# Patient Record
Sex: FEMALE | Race: WHITE | NOT HISPANIC OR LATINO
[De-identification: names, ages, dates, MRNs, and addresses within clinical notes are randomized per-mention and may not be internally consistent; named-entity substitution may affect disease eponyms.]

---

## 2017-02-11 ENCOUNTER — RX RENEWAL (OUTPATIENT)
Age: 77
End: 2017-02-11

## 2017-03-09 ENCOUNTER — LABORATORY RESULT (OUTPATIENT)
Age: 77
End: 2017-03-09

## 2017-03-09 ENCOUNTER — APPOINTMENT (OUTPATIENT)
Dept: RHEUMATOLOGY | Facility: CLINIC | Age: 77
End: 2017-03-09

## 2017-03-09 VITALS
DIASTOLIC BLOOD PRESSURE: 80 MMHG | SYSTOLIC BLOOD PRESSURE: 124 MMHG | HEART RATE: 70 BPM | HEIGHT: 59 IN | WEIGHT: 145 LBS | BODY MASS INDEX: 29.23 KG/M2 | TEMPERATURE: 97.9 F | RESPIRATION RATE: 20 BRPM

## 2017-03-09 LAB
BILIRUB UR QL STRIP: NORMAL
COLLECTION METHOD: NORMAL
GLUCOSE UR-MCNC: NORMAL
HCG UR QL: 0.2 EU/DL
HGB UR QL STRIP.AUTO: NORMAL
KETONES UR-MCNC: NORMAL
LEUKOCYTE ESTERASE UR QL STRIP: NORMAL
NITRITE UR QL STRIP: NORMAL
PH UR STRIP: 7
PROT UR STRIP-MCNC: NORMAL
SP GR UR STRIP: 1.01
WESR: 16

## 2017-03-10 LAB
APPEARANCE: CLEAR
BACTERIA: NEGATIVE
BASOPHILS # BLD AUTO: 0.07 K/UL
BASOPHILS NFR BLD AUTO: 0.9 %
BILIRUBIN URINE: NEGATIVE
BLOOD URINE: NEGATIVE
COLOR: YELLOW
EOSINOPHIL # BLD AUTO: 0.15 K/UL
EOSINOPHIL NFR BLD AUTO: 2 %
FERRITIN SERPL-MCNC: 58.8 NG/ML
GLUCOSE QUALITATIVE U: NORMAL MG/DL
HCT VFR BLD CALC: 41.1 %
HGB BLD-MCNC: 13.2 G/DL
HYALINE CASTS: 0 /LPF
IMM GRANULOCYTES NFR BLD AUTO: 0.1 %
KETONES URINE: NEGATIVE
LEUKOCYTE ESTERASE URINE: ABNORMAL
LYMPHOCYTES # BLD AUTO: 1.17 K/UL
LYMPHOCYTES NFR BLD AUTO: 15.5 %
MAN DIFF?: NORMAL
MCHC RBC-ENTMCNC: 29.2 PG
MCHC RBC-ENTMCNC: 32.1 GM/DL
MCV RBC AUTO: 90.9 FL
MICROSCOPIC-UA: NORMAL
MONOCYTES # BLD AUTO: 0.42 K/UL
MONOCYTES NFR BLD AUTO: 5.6 %
NEUTROPHILS # BLD AUTO: 5.73 K/UL
NEUTROPHILS NFR BLD AUTO: 75.9 %
NITRITE URINE: NEGATIVE
PH URINE: 7
PLATELET # BLD AUTO: 225 K/UL
PROTEIN URINE: NEGATIVE MG/DL
RBC # BLD: 4.52 M/UL
RBC # FLD: 13.7 %
RED BLOOD CELLS URINE: 1 /HPF
SPECIFIC GRAVITY URINE: 1.01
SQUAMOUS EPITHELIAL CELLS: 1 /HPF
UROBILINOGEN URINE: NORMAL MG/DL
WBC # FLD AUTO: 7.55 K/UL
WHITE BLOOD CELLS URINE: 1 /HPF

## 2017-03-12 LAB
ALBUMIN SERPL ELPH-MCNC: 3.8 G/DL
ALP BLD-CCNC: 49 U/L
ALT SERPL-CCNC: 20 U/L
ANA PAT FLD IF-IMP: ABNORMAL
ANA SER IF-ACNC: ABNORMAL
ANION GAP SERPL CALC-SCNC: 17 MMOL/L
AST SERPL-CCNC: 21 U/L
BILIRUB SERPL-MCNC: 0.5 MG/DL
BUN SERPL-MCNC: 15 MG/DL
CALCIUM SERPL-MCNC: 10.4 MG/DL
CHLORIDE SERPL-SCNC: 105 MMOL/L
CO2 SERPL-SCNC: 24 MMOL/L
CREAT SERPL-MCNC: 0.87 MG/DL
CRP SERPL-MCNC: <0.2 MG/DL
DSDNA AB SER-ACNC: 14 IU/ML
ENA SS-A AB SER IA-ACNC: <0.2 AL
ENA SS-B AB SER IA-ACNC: <0.2 AL
GLUCOSE SERPL-MCNC: 118 MG/DL
IRON SATN MFR SERPL: 28 %
IRON SERPL-MCNC: 93 UG/DL
MAGNESIUM SERPL-MCNC: 2 MG/DL
PHOSPHATE SERPL-MCNC: 3.7 MG/DL
POTASSIUM SERPL-SCNC: 4.8 MMOL/L
PROT SERPL-MCNC: 6.4 G/DL
SODIUM SERPL-SCNC: 146 MMOL/L
TIBC SERPL-MCNC: 333 UG/DL
UIBC SERPL-MCNC: 240 UG/DL

## 2017-05-09 ENCOUNTER — APPOINTMENT (OUTPATIENT)
Dept: OBGYN | Facility: CLINIC | Age: 77
End: 2017-05-09

## 2017-05-09 VITALS
SYSTOLIC BLOOD PRESSURE: 133 MMHG | BODY MASS INDEX: 29.23 KG/M2 | HEIGHT: 59 IN | WEIGHT: 145 LBS | DIASTOLIC BLOOD PRESSURE: 80 MMHG

## 2017-05-09 RX ORDER — MEMANTINE HYDROCHLORIDE 10 MG/1
10 TABLET, FILM COATED ORAL
Qty: 60 | Refills: 0 | Status: ACTIVE | COMMUNITY
Start: 2016-11-17

## 2017-05-16 LAB — CYTOLOGY CVX/VAG DOC THIN PREP: NORMAL

## 2017-05-27 ENCOUNTER — MEDICATION RENEWAL (OUTPATIENT)
Age: 77
End: 2017-05-27

## 2017-06-27 ENCOUNTER — APPOINTMENT (OUTPATIENT)
Dept: RHEUMATOLOGY | Facility: CLINIC | Age: 77
End: 2017-06-27

## 2017-08-07 ENCOUNTER — APPOINTMENT (OUTPATIENT)
Dept: RHEUMATOLOGY | Facility: CLINIC | Age: 77
End: 2017-08-07
Payer: MEDICARE

## 2017-08-07 VITALS
HEIGHT: 58 IN | BODY MASS INDEX: 31.91 KG/M2 | WEIGHT: 152 LBS | RESPIRATION RATE: 16 BRPM | SYSTOLIC BLOOD PRESSURE: 130 MMHG | TEMPERATURE: 98.5 F | HEART RATE: 82 BPM | DIASTOLIC BLOOD PRESSURE: 80 MMHG

## 2017-08-07 DIAGNOSIS — M21.961 UNSPECIFIED ACQUIRED DEFORMITY OF RIGHT LOWER LEG: ICD-10-CM

## 2017-08-07 DIAGNOSIS — M21.962 UNSPECIFIED ACQUIRED DEFORMITY OF RIGHT LOWER LEG: ICD-10-CM

## 2017-08-07 PROCEDURE — 85651 RBC SED RATE NONAUTOMATED: CPT

## 2017-08-07 PROCEDURE — 99214 OFFICE O/P EST MOD 30 MIN: CPT | Mod: 25

## 2017-08-07 PROCEDURE — 36415 COLL VENOUS BLD VENIPUNCTURE: CPT

## 2017-08-08 LAB
ALBUMIN SERPL ELPH-MCNC: 4.4 G/DL
ALP BLD-CCNC: 52 U/L
ALT SERPL-CCNC: 26 U/L
ANION GAP SERPL CALC-SCNC: 13 MMOL/L
APPEARANCE: CLEAR
AST SERPL-CCNC: 26 U/L
BACTERIA: ABNORMAL
BASOPHILS # BLD AUTO: 0.04 K/UL
BASOPHILS NFR BLD AUTO: 0.5 %
BILIRUB SERPL-MCNC: 0.4 MG/DL
BILIRUB UR QL STRIP: NORMAL
BILIRUBIN URINE: NEGATIVE
BLOOD URINE: ABNORMAL
BUN SERPL-MCNC: 18 MG/DL
CALCIUM SERPL-MCNC: 10.4 MG/DL
CHLORIDE SERPL-SCNC: 103 MMOL/L
CLARITY UR: CLEAR
CO2 SERPL-SCNC: 28 MMOL/L
COLLECTION METHOD: NORMAL
COLOR: YELLOW
CREAT SERPL-MCNC: 0.88 MG/DL
CRP SERPL-MCNC: 0.2 MG/DL
EOSINOPHIL # BLD AUTO: 0.15 K/UL
EOSINOPHIL NFR BLD AUTO: 1.9 %
GLUCOSE QUALITATIVE U: NORMAL MG/DL
GLUCOSE SERPL-MCNC: 99 MG/DL
GLUCOSE UR-MCNC: NORMAL
HCG UR QL: 0.2 EU/DL
HCT VFR BLD CALC: 42.9 %
HGB BLD-MCNC: 14.3 G/DL
HGB UR QL STRIP.AUTO: NORMAL
HYALINE CASTS: 0 /LPF
IMM GRANULOCYTES NFR BLD AUTO: 0.4 %
KETONES UR-MCNC: NORMAL
KETONES URINE: NEGATIVE
LEUKOCYTE ESTERASE UR QL STRIP: NORMAL
LEUKOCYTE ESTERASE URINE: ABNORMAL
LYMPHOCYTES # BLD AUTO: 1.39 K/UL
LYMPHOCYTES NFR BLD AUTO: 17.5 %
MAN DIFF?: NORMAL
MCHC RBC-ENTMCNC: 29.7 PG
MCHC RBC-ENTMCNC: 33.3 GM/DL
MCV RBC AUTO: 89.2 FL
MICROSCOPIC-UA: NORMAL
MONOCYTES # BLD AUTO: 0.38 K/UL
MONOCYTES NFR BLD AUTO: 4.8 %
NEUTROPHILS # BLD AUTO: 5.95 K/UL
NEUTROPHILS NFR BLD AUTO: 74.9 %
NITRITE UR QL STRIP: NORMAL
NITRITE URINE: NEGATIVE
PH UR STRIP: 6.5
PH URINE: 6.5
PHOSPHATE SERPL-MCNC: 3.7 MG/DL
PLATELET # BLD AUTO: 239 K/UL
POTASSIUM SERPL-SCNC: 5.7 MMOL/L
PROT SERPL-MCNC: 7.1 G/DL
PROT UR STRIP-MCNC: NORMAL
PROTEIN URINE: NEGATIVE MG/DL
RBC # BLD: 4.81 M/UL
RBC # FLD: 13.4 %
RED BLOOD CELLS URINE: 2 /HPF
SODIUM SERPL-SCNC: 144 MMOL/L
SP GR UR STRIP: 1.01
SPECIFIC GRAVITY URINE: 1.01
SQUAMOUS EPITHELIAL CELLS: 9 /HPF
TSH SERPL-ACNC: 1.68 UIU/ML
UROBILINOGEN URINE: NORMAL MG/DL
WBC # FLD AUTO: 7.94 K/UL
WESR: 20
WHITE BLOOD CELLS URINE: 23 /HPF

## 2017-08-09 LAB
DEPRECATED KAPPA LC FREE/LAMBDA SER: 2.37 RATIO
ENA SS-A AB SER IA-ACNC: <0.2 AL
ENA SS-B AB SER IA-ACNC: <0.2 AL
IGA SER QL IEP: 171 MG/DL
IGG SER QL IEP: 930 MG/DL
IGM SER QL IEP: 88 MG/DL
KAPPA LC CSF-MCNC: 0.91 MG/DL
KAPPA LC SERPL-MCNC: 2.16 MG/DL

## 2017-08-10 LAB — M PROTEIN SPEC IFE-MCNC: NORMAL

## 2017-10-17 ENCOUNTER — MEDICATION RENEWAL (OUTPATIENT)
Age: 77
End: 2017-10-17

## 2017-12-11 ENCOUNTER — APPOINTMENT (OUTPATIENT)
Dept: RHEUMATOLOGY | Facility: CLINIC | Age: 77
End: 2017-12-11

## 2017-12-22 ENCOUNTER — RX RENEWAL (OUTPATIENT)
Age: 77
End: 2017-12-22

## 2017-12-31 ENCOUNTER — RX RENEWAL (OUTPATIENT)
Age: 77
End: 2017-12-31

## 2018-02-17 ENCOUNTER — RX RENEWAL (OUTPATIENT)
Age: 78
End: 2018-02-17

## 2018-02-23 ENCOUNTER — LABORATORY RESULT (OUTPATIENT)
Age: 78
End: 2018-02-23

## 2018-02-23 ENCOUNTER — APPOINTMENT (OUTPATIENT)
Dept: RHEUMATOLOGY | Facility: CLINIC | Age: 78
End: 2018-02-23
Payer: MEDICARE

## 2018-02-23 VITALS
BODY MASS INDEX: 27.82 KG/M2 | RESPIRATION RATE: 17 BRPM | HEIGHT: 59 IN | SYSTOLIC BLOOD PRESSURE: 118 MMHG | TEMPERATURE: 98.7 F | DIASTOLIC BLOOD PRESSURE: 68 MMHG | OXYGEN SATURATION: 98 % | WEIGHT: 138 LBS | HEART RATE: 58 BPM

## 2018-02-23 DIAGNOSIS — F03.90 UNSPECIFIED DEMENTIA W/OUT BEHAVIORAL DISTURBANCE: ICD-10-CM

## 2018-02-23 DIAGNOSIS — Z00.00 ENCOUNTER FOR GENERAL ADULT MEDICAL EXAMINATION W/OUT ABNORMAL FINDINGS: ICD-10-CM

## 2018-02-23 DIAGNOSIS — M15.9 POLYOSTEOARTHRITIS, UNSPECIFIED: ICD-10-CM

## 2018-02-23 DIAGNOSIS — R68.2 DRY MOUTH, UNSPECIFIED: ICD-10-CM

## 2018-02-23 DIAGNOSIS — Z86.69 PERSONAL HISTORY OF OTHER DISEASES OF THE NERVOUS SYSTEM AND SENSE ORGANS: ICD-10-CM

## 2018-02-23 DIAGNOSIS — L65.9 NONSCARRING HAIR LOSS, UNSPECIFIED: ICD-10-CM

## 2018-02-23 DIAGNOSIS — M81.0 AGE-RELATED OSTEOPOROSIS W/OUT CURRENT PATHOLOGICAL FRACTURE: ICD-10-CM

## 2018-02-23 DIAGNOSIS — M25.561 PAIN IN RIGHT KNEE: ICD-10-CM

## 2018-02-23 DIAGNOSIS — G89.29 PAIN IN RIGHT KNEE: ICD-10-CM

## 2018-02-23 DIAGNOSIS — G47.62 SLEEP RELATED LEG CRAMPS: ICD-10-CM

## 2018-02-23 DIAGNOSIS — H04.123 DRY EYE SYNDROME OF BILATERAL LACRIMAL GLANDS: ICD-10-CM

## 2018-02-23 DIAGNOSIS — Z63.4 DISAPPEARANCE AND DEATH OF FAMILY MEMBER: ICD-10-CM

## 2018-02-23 DIAGNOSIS — I73.00 RAYNAUD'S SYNDROME W/OUT GANGRENE: ICD-10-CM

## 2018-02-23 DIAGNOSIS — M35.00 SICCA SYNDROME, UNSPECIFIED: ICD-10-CM

## 2018-02-23 DIAGNOSIS — R76.8 OTHER SPECIFIED ABNORMAL IMMUNOLOGICAL FINDINGS IN SERUM: ICD-10-CM

## 2018-02-23 PROCEDURE — 36415 COLL VENOUS BLD VENIPUNCTURE: CPT

## 2018-02-23 PROCEDURE — 99215 OFFICE O/P EST HI 40 MIN: CPT | Mod: 25

## 2018-02-23 RX ORDER — CEPHALEXIN 500 MG/1
500 CAPSULE ORAL
Qty: 14 | Refills: 0 | Status: DISCONTINUED | COMMUNITY
Start: 2018-01-08

## 2018-02-23 RX ORDER — CARBIDOPA AND LEVODOPA 25; 100 MG/1; MG/1
25-100 TABLET ORAL
Qty: 90 | Refills: 0 | Status: ACTIVE | COMMUNITY
Start: 2018-02-15

## 2018-02-23 RX ORDER — CEFADROXIL 500 MG/1
500 CAPSULE ORAL
Qty: 20 | Refills: 0 | Status: DISCONTINUED | COMMUNITY
Start: 2018-01-29

## 2018-02-23 SDOH — SOCIAL STABILITY - SOCIAL INSECURITY: DISSAPEARANCE AND DEATH OF FAMILY MEMBER: Z63.4

## 2018-02-24 PROBLEM — Z86.69 HISTORY OF PARKINSON'S DISEASE: Status: RESOLVED | Noted: 2018-02-24 | Resolved: 2018-02-24

## 2018-02-24 PROBLEM — Z63.4 WIDOWED: Status: ACTIVE | Noted: 2018-02-24

## 2018-02-24 LAB
ALBUMIN SERPL ELPH-MCNC: 4 G/DL
ALP BLD-CCNC: 55 U/L
ALT SERPL-CCNC: 24 U/L
ANA PAT FLD IF-IMP: ABNORMAL
ANA SER IF-ACNC: ABNORMAL
ANION GAP SERPL CALC-SCNC: 15 MMOL/L
AST SERPL-CCNC: 26 U/L
BILIRUB SERPL-MCNC: 0.3 MG/DL
BUN SERPL-MCNC: 17 MG/DL
CALCIUM SERPL-MCNC: 10 MG/DL
CHLORIDE SERPL-SCNC: 94 MMOL/L
CK SERPL-CCNC: 76 U/L
CO2 SERPL-SCNC: 26 MMOL/L
CREAT SERPL-MCNC: 0.67 MG/DL
CRP SERPL-MCNC: <0.2 MG/DL
FOLATE SERPL-MCNC: 17.3 NG/ML
GLUCOSE SERPL-MCNC: 109 MG/DL
PHOSPHATE SERPL-MCNC: 3.7 MG/DL
POTASSIUM SERPL-SCNC: 5.9 MMOL/L
PROT SERPL-MCNC: 7.1 G/DL
SODIUM SERPL-SCNC: 135 MMOL/L
VIT B12 SERPL-MCNC: 1290 PG/ML

## 2018-02-26 ENCOUNTER — APPOINTMENT (OUTPATIENT)
Dept: RHEUMATOLOGY | Facility: CLINIC | Age: 78
End: 2018-02-26

## 2018-02-26 ENCOUNTER — EMERGENCY (EMERGENCY)
Facility: HOSPITAL | Age: 78
LOS: 1 days | Discharge: HOME | End: 2018-02-26
Attending: EMERGENCY MEDICINE
Payer: MEDICARE

## 2018-02-26 VITALS
DIASTOLIC BLOOD PRESSURE: 78 MMHG | HEART RATE: 75 BPM | SYSTOLIC BLOOD PRESSURE: 135 MMHG | OXYGEN SATURATION: 98 % | RESPIRATION RATE: 16 BRPM | TEMPERATURE: 99 F

## 2018-02-26 VITALS — WEIGHT: 139.99 LBS

## 2018-02-26 DIAGNOSIS — Z96.649 PRESENCE OF UNSPECIFIED ARTIFICIAL HIP JOINT: Chronic | ICD-10-CM

## 2018-02-26 LAB
ALBUMIN SERPL ELPH-MCNC: 3.5 G/DL — SIGNIFICANT CHANGE UP (ref 3.3–5.2)
ALDOLASE SERPL-CCNC: 7 U/L
ALP SERPL-CCNC: 48 U/L — SIGNIFICANT CHANGE UP (ref 40–120)
ALT FLD-CCNC: 20 U/L — SIGNIFICANT CHANGE UP
ANION GAP SERPL CALC-SCNC: 11 MMOL/L — SIGNIFICANT CHANGE UP (ref 5–17)
APPEARANCE UR: CLEAR — SIGNIFICANT CHANGE UP
APTT BLD: 32.7 SEC — SIGNIFICANT CHANGE UP (ref 27.5–37.4)
AST SERPL-CCNC: 22 U/L — SIGNIFICANT CHANGE UP
BACTERIA # UR AUTO: ABNORMAL
BASOPHILS # BLD AUTO: 0 K/UL — SIGNIFICANT CHANGE UP (ref 0–0.2)
BASOPHILS NFR BLD AUTO: 0.5 % — SIGNIFICANT CHANGE UP (ref 0–2)
BILIRUB SERPL-MCNC: 0.6 MG/DL — SIGNIFICANT CHANGE UP (ref 0.4–2)
BILIRUB UR-MCNC: NEGATIVE — SIGNIFICANT CHANGE UP
BUN SERPL-MCNC: 15 MG/DL — SIGNIFICANT CHANGE UP (ref 8–20)
CALCIUM SERPL-MCNC: 8.9 MG/DL — SIGNIFICANT CHANGE UP (ref 8.6–10.2)
CHLORIDE SERPL-SCNC: 86 MMOL/L — LOW (ref 98–107)
CK SERPL-CCNC: 68 U/L — SIGNIFICANT CHANGE UP (ref 25–170)
CO2 SERPL-SCNC: 24 MMOL/L — SIGNIFICANT CHANGE UP (ref 22–29)
COLOR SPEC: SIGNIFICANT CHANGE UP
CREAT SERPL-MCNC: 0.48 MG/DL — LOW (ref 0.5–1.3)
DIFF PNL FLD: NEGATIVE — SIGNIFICANT CHANGE UP
EOSINOPHIL # BLD AUTO: 0.2 K/UL — SIGNIFICANT CHANGE UP (ref 0–0.5)
EOSINOPHIL NFR BLD AUTO: 1.6 % — SIGNIFICANT CHANGE UP (ref 0–6)
EPI CELLS # UR: SIGNIFICANT CHANGE UP
GLUCOSE SERPL-MCNC: 119 MG/DL — HIGH (ref 70–115)
GLUCOSE UR QL: NEGATIVE MG/DL — SIGNIFICANT CHANGE UP
HCT VFR BLD CALC: 37 % — SIGNIFICANT CHANGE UP (ref 37–47)
HGB BLD-MCNC: 12.4 G/DL — SIGNIFICANT CHANGE UP (ref 12–16)
INR BLD: 0.9 RATIO — SIGNIFICANT CHANGE UP (ref 0.88–1.16)
KETONES UR-MCNC: NEGATIVE — SIGNIFICANT CHANGE UP
LACTATE BLDV-MCNC: 2 MMOL/L — SIGNIFICANT CHANGE UP (ref 0.5–2)
LEUKOCYTE ESTERASE UR-ACNC: ABNORMAL
LYMPHOCYTES # BLD AUTO: 1.7 K/UL — SIGNIFICANT CHANGE UP (ref 1–4.8)
LYMPHOCYTES # BLD AUTO: 17.3 % — LOW (ref 20–55)
MAGNESIUM SERPL-MCNC: 1.7 MG/DL — SIGNIFICANT CHANGE UP (ref 1.6–2.6)
MCHC RBC-ENTMCNC: 28.8 PG — SIGNIFICANT CHANGE UP (ref 27–31)
MCHC RBC-ENTMCNC: 33.5 G/DL — SIGNIFICANT CHANGE UP (ref 32–36)
MCV RBC AUTO: 85.8 FL — SIGNIFICANT CHANGE UP (ref 81–99)
MONOCYTES # BLD AUTO: 0.7 K/UL — SIGNIFICANT CHANGE UP (ref 0–0.8)
MONOCYTES NFR BLD AUTO: 6.9 % — SIGNIFICANT CHANGE UP (ref 3–10)
NEUTROPHILS # BLD AUTO: 7.1 K/UL — SIGNIFICANT CHANGE UP (ref 1.8–8)
NEUTROPHILS NFR BLD AUTO: 73.5 % — HIGH (ref 37–73)
NITRITE UR-MCNC: NEGATIVE — SIGNIFICANT CHANGE UP
PH UR: 7 — SIGNIFICANT CHANGE UP (ref 5–8)
PHOSPHATE SERPL-MCNC: 2.4 MG/DL — SIGNIFICANT CHANGE UP (ref 2.4–4.7)
PLATELET # BLD AUTO: 239 K/UL — SIGNIFICANT CHANGE UP (ref 150–400)
POTASSIUM SERPL-MCNC: 4.6 MMOL/L — SIGNIFICANT CHANGE UP (ref 3.5–5.3)
POTASSIUM SERPL-SCNC: 4.6 MMOL/L — SIGNIFICANT CHANGE UP (ref 3.5–5.3)
PROT SERPL-MCNC: 6 G/DL — LOW (ref 6.6–8.7)
PROT UR-MCNC: NEGATIVE MG/DL — SIGNIFICANT CHANGE UP
PROTHROM AB SERPL-ACNC: 9.9 SEC — SIGNIFICANT CHANGE UP (ref 9.8–12.7)
RBC # BLD: 4.31 M/UL — LOW (ref 4.4–5.2)
RBC # FLD: 12.4 % — SIGNIFICANT CHANGE UP (ref 11–15.6)
RBC CASTS # UR COMP ASSIST: SIGNIFICANT CHANGE UP /HPF (ref 0–4)
SODIUM SERPL-SCNC: 121 MMOL/L — LOW (ref 135–145)
SP GR SPEC: 1.01 — SIGNIFICANT CHANGE UP (ref 1.01–1.02)
TROPONIN T SERPL-MCNC: <0.01 NG/ML — SIGNIFICANT CHANGE UP (ref 0–0.06)
UROBILINOGEN FLD QL: NEGATIVE MG/DL — SIGNIFICANT CHANGE UP
WBC # BLD: 9.6 K/UL — SIGNIFICANT CHANGE UP (ref 4.8–10.8)
WBC # FLD AUTO: 9.6 K/UL — SIGNIFICANT CHANGE UP (ref 4.8–10.8)
WBC UR QL: SIGNIFICANT CHANGE UP

## 2018-02-26 PROCEDURE — 84484 ASSAY OF TROPONIN QUANT: CPT

## 2018-02-26 PROCEDURE — 87086 URINE CULTURE/COLONY COUNT: CPT

## 2018-02-26 PROCEDURE — 99284 EMERGENCY DEPT VISIT MOD MDM: CPT | Mod: 25

## 2018-02-26 PROCEDURE — 93005 ELECTROCARDIOGRAM TRACING: CPT

## 2018-02-26 PROCEDURE — 93010 ELECTROCARDIOGRAM REPORT: CPT

## 2018-02-26 PROCEDURE — 83605 ASSAY OF LACTIC ACID: CPT

## 2018-02-26 PROCEDURE — 99291 CRITICAL CARE FIRST HOUR: CPT

## 2018-02-26 PROCEDURE — 82550 ASSAY OF CK (CPK): CPT

## 2018-02-26 PROCEDURE — 71046 X-RAY EXAM CHEST 2 VIEWS: CPT

## 2018-02-26 PROCEDURE — 85027 COMPLETE CBC AUTOMATED: CPT

## 2018-02-26 PROCEDURE — 85730 THROMBOPLASTIN TIME PARTIAL: CPT

## 2018-02-26 PROCEDURE — 70450 CT HEAD/BRAIN W/O DYE: CPT | Mod: 26

## 2018-02-26 PROCEDURE — 83735 ASSAY OF MAGNESIUM: CPT

## 2018-02-26 PROCEDURE — 81001 URINALYSIS AUTO W/SCOPE: CPT

## 2018-02-26 PROCEDURE — 70450 CT HEAD/BRAIN W/O DYE: CPT

## 2018-02-26 PROCEDURE — 71046 X-RAY EXAM CHEST 2 VIEWS: CPT | Mod: 26

## 2018-02-26 PROCEDURE — 36415 COLL VENOUS BLD VENIPUNCTURE: CPT

## 2018-02-26 PROCEDURE — 80053 COMPREHEN METABOLIC PANEL: CPT

## 2018-02-26 PROCEDURE — 85610 PROTHROMBIN TIME: CPT

## 2018-02-26 PROCEDURE — 84100 ASSAY OF PHOSPHORUS: CPT

## 2018-02-26 PROCEDURE — 82962 GLUCOSE BLOOD TEST: CPT

## 2018-02-26 RX ORDER — ASPIRIN/CALCIUM CARB/MAGNESIUM 324 MG
1 TABLET ORAL
Qty: 0 | Refills: 0 | COMMUNITY

## 2018-02-26 RX ORDER — SODIUM CHLORIDE 9 MG/ML
1000 INJECTION INTRAMUSCULAR; INTRAVENOUS; SUBCUTANEOUS ONCE
Qty: 0 | Refills: 0 | Status: DISCONTINUED | OUTPATIENT
Start: 2018-02-26 | End: 2018-03-02

## 2018-02-26 RX ORDER — CALCIUM CARBONATE 500(1250)
2 TABLET ORAL
Qty: 0 | Refills: 0 | COMMUNITY

## 2018-02-26 RX ORDER — SODIUM CHLORIDE 9 MG/ML
3 INJECTION INTRAMUSCULAR; INTRAVENOUS; SUBCUTANEOUS ONCE
Qty: 0 | Refills: 0 | Status: DISCONTINUED | OUTPATIENT
Start: 2018-02-26 | End: 2018-03-02

## 2018-02-26 RX ORDER — CHOLECALCIFEROL (VITAMIN D3) 125 MCG
0 CAPSULE ORAL
Qty: 0 | Refills: 0 | COMMUNITY

## 2018-02-26 RX ORDER — ALPRAZOLAM 0.25 MG
1 TABLET ORAL
Qty: 0 | Refills: 0 | COMMUNITY

## 2018-02-26 RX ORDER — ASCORBIC ACID 60 MG
1 TABLET,CHEWABLE ORAL
Qty: 0 | Refills: 0 | COMMUNITY

## 2018-02-26 RX ORDER — MEMANTINE HYDROCHLORIDE 10 MG/1
1 TABLET ORAL
Qty: 0 | Refills: 0 | COMMUNITY

## 2018-02-26 NOTE — ED ADULT NURSE NOTE - CHIEF COMPLAINT QUOTE
pt brought in by niece for worsening confusion,  as per niece it has been progressively getting more confused over last week  was dx w/ parkinson , started on meds but they were dc/d when symptoms seemed to get worse, niece states today it all seemed worse, pt could not speak clearly today and had trouble walking, no facial droop noted

## 2018-02-26 NOTE — ED PROVIDER NOTE - CARE PLAN
Principal Discharge DX:	Dysphonia  Secondary Diagnosis:	Generalized weakness  Secondary Diagnosis:	Hyponatremia

## 2018-02-26 NOTE — ED PROVIDER NOTE - UNABLE TO OBTAIN
Unable to obtain HPI secondary to AMS. Urgent need for Intervention Unable to obtain ROS secondary to AMS.

## 2018-02-26 NOTE — ED PROVIDER NOTE - CRITICAL CARE PROVIDED
direct patient care (not related to procedure)/consult w/ pt's family directly relating to pts condition/additional history taking/consultation with other physicians/interpretation of diagnostic studies/documentation

## 2018-02-26 NOTE — ED PROVIDER NOTE - OBJECTIVE STATEMENT
This is a 76 y/o female with PMHx dementia and DM presents to the ED accompanied by family for evaluation AMS. Pt niece states she noted around 1530 that pt was not been acting herself, states pt had difficulty ambulating, and difficulty "getting words out." Daughters report pt to be tremulous and pallor. Per daughter, last seen normal between 0800 and 1000 this morning. Pt daughter states pt was seen by PMD yesterday and found to have a potassium of 5.9, however, everything else was within normal limits. Pt takes Aspirin 81, Namenda, Prolia.   PMD: Dr. Torrez   Allergy: Sulfa

## 2018-02-26 NOTE — ED ADULT NURSE NOTE - CHPI ED SYMPTOMS NEG
no dizziness/no change in level of consciousness/no fever/no vomiting/no numbness/no loss of consciousness/no nausea/no blurred vision

## 2018-02-26 NOTE — ED ADULT NURSE REASSESSMENT NOTE - NS ED NURSE REASSESS COMMENT FT1
pt sitting calm in bed. a and o to self. breathing even and unlabored. nsr on cm. family at bedside. pt has no complaints at this time. will continue to monitor.

## 2018-02-26 NOTE — ED PROVIDER NOTE - PHYSICAL EXAMINATION
Const: Awake, alert and oriented. In no acute distress. Well appearing.  HEENT: NC/AT. Moist mucous membranes.  Eyes: No scleral icterus. EOMI.  Neck:. Soft and supple. Full ROM without pain.  Cardiac: Regular rate and rhythm. +S1/S2. No murmurs. Peripheral pulses 2+ and symmetric. No LE edema.  Resp: Speaking in full sentences. No evidence of respiratory distress. No wheezes, rales or rhonchi.  Abd: Soft, non-tender, non-distended. Normal bowel sounds in all 4 quadrants. No guarding or rebound.  Back: Spine midline and non-tender. No CVAT.  Skin: No rashes, abrasions or lacerations.  Lymph: No cervical lymphadenopathy.   CN II-XII grossly in tact. Difficulty with coordination for finger/nose. Normal heel to shin. No pronator drift. Speech clear, upper extremities strength 5/5 symmetrically, RLE strength 1/5, LLE strength 5/5. Reflexes 2+ bilaterally. Babinski negative bilaterally.

## 2018-02-26 NOTE — ED PROVIDER NOTE - MEDICAL DECISION MAKING DETAILS
Pt presents with AMS, concern for stroke, dysarthria, RLE weakness, AMS per the family, no focal neuro deficits on exam, pt discussed with family and intensivist decided this is not a stroke, will work up for other causes of mental status.

## 2018-02-26 NOTE — ED ADULT NURSE NOTE - OBJECTIVE STATEMENT
as per family, pt received at 1530 confused more so from baseline and having difficulty ambulating. as per family, last seen normal in person at 0800 hrs, as per family spoke with pt at 1000 hrs and speaking like normal self at that time. alert and o x1, as per family hx of dementia, baseline a and o x3 with periods of dsyphasia and forgetfulness. breathing even and unlabored. nsr on cm. + and = peripheral pulses. no le edema. abdomen soft nontender nondistended. will continue to monitor.

## 2018-02-26 NOTE — ED ADULT TRIAGE NOTE - CHIEF COMPLAINT QUOTE
pt brought in by niece for worsening confusion,  as per niece it has been progressively getting more confused over last week  was dx w/ parkinson , started on meds but they were dc/d when symptoms seemed to get worse, niece states today it all seemed worse, pt could not speak clearly today and had trouble walking, no facial droop noted pt brought in by niece for worsening confusion,  as per niece it has been progressively getting more confused over last week  was dx w/ parkinson , started on meds but they were dc/d when symptoms seemed to get worse, niece states today it all seemed worse, pt could not speak clearly today and had trouble walking, no facial droop noted, md Ferreira in to emily pt,  at [id number] pt brought in by niece for worsening confusion,  as per niece it has been progressively getting more confused over last week  was dx w/ parkinson , started on meds but they were dc/d when symptoms seemed to get worse, niece states today it all seemed worse, pt could not speak clearly today and had trouble walking, no facial droop noted, md Ferreira in to emily pt,  at [id number]

## 2018-02-27 LAB
CULTURE RESULTS: NO GROWTH — SIGNIFICANT CHANGE UP
SPECIMEN SOURCE: SIGNIFICANT CHANGE UP

## 2018-03-03 LAB
ENA JO1 AB SER IA-ACNC: <0.2 AL
ENA RNP AB SER IA-ACNC: <0.2 AL
ENA SCL70 IGG SER IA-ACNC: 0.5 AL
ENA SM AB SER IA-ACNC: <0.2 AL
ENA SS-A AB SER IA-ACNC: <0.2 AL
ENA SS-B AB SER IA-ACNC: <0.2 AL

## 2018-03-08 ENCOUNTER — APPOINTMENT (OUTPATIENT)
Dept: RHEUMATOLOGY | Facility: CLINIC | Age: 78
End: 2018-03-08
Payer: MEDICARE

## 2018-03-08 PROCEDURE — 96372 THER/PROPH/DIAG INJ SC/IM: CPT

## 2018-05-05 ENCOUNTER — RX RENEWAL (OUTPATIENT)
Age: 78
End: 2018-05-05

## 2018-06-12 ENCOUNTER — APPOINTMENT (OUTPATIENT)
Dept: OBGYN | Facility: CLINIC | Age: 78
End: 2018-06-12
Payer: MEDICARE

## 2018-06-12 VITALS
BODY MASS INDEX: 28.22 KG/M2 | DIASTOLIC BLOOD PRESSURE: 90 MMHG | WEIGHT: 140 LBS | SYSTOLIC BLOOD PRESSURE: 130 MMHG | HEIGHT: 59 IN

## 2018-06-12 DIAGNOSIS — N39.0 URINARY TRACT INFECTION, SITE NOT SPECIFIED: ICD-10-CM

## 2018-06-12 PROCEDURE — 99214 OFFICE O/P EST MOD 30 MIN: CPT

## 2018-06-12 RX ORDER — NITROFURANTOIN (MONOHYDRATE/MACROCRYSTALS) 25; 75 MG/1; MG/1
100 CAPSULE ORAL
Qty: 10 | Refills: 0 | Status: ACTIVE | COMMUNITY
Start: 2018-06-12 | End: 1900-01-01

## 2018-06-12 RX ORDER — CLOTRIMAZOLE AND BETAMETHASONE DIPROPIONATE 10; .5 MG/G; MG/G
1-0.05 CREAM TOPICAL TWICE DAILY
Qty: 1 | Refills: 1 | Status: ACTIVE | COMMUNITY
Start: 2018-06-12 | End: 1900-01-01

## 2018-07-02 ENCOUNTER — INPATIENT (INPATIENT)
Facility: HOSPITAL | Age: 78
LOS: 4 days | Discharge: HOME | DRG: 689 | End: 2018-07-07
Attending: FAMILY MEDICINE | Admitting: HOSPITALIST
Payer: MEDICARE

## 2018-07-02 VITALS
OXYGEN SATURATION: 98 % | SYSTOLIC BLOOD PRESSURE: 166 MMHG | HEART RATE: 90 BPM | TEMPERATURE: 99 F | HEIGHT: 65 IN | DIASTOLIC BLOOD PRESSURE: 90 MMHG | RESPIRATION RATE: 18 BRPM | WEIGHT: 160.06 LBS

## 2018-07-02 DIAGNOSIS — Z96.649 PRESENCE OF UNSPECIFIED ARTIFICIAL HIP JOINT: Chronic | ICD-10-CM

## 2018-07-02 LAB
ALBUMIN SERPL ELPH-MCNC: 4 G/DL — SIGNIFICANT CHANGE UP (ref 3.3–5.2)
ALP SERPL-CCNC: 47 U/L — SIGNIFICANT CHANGE UP (ref 40–120)
ALT FLD-CCNC: 20 U/L — SIGNIFICANT CHANGE UP
AMMONIA BLD-MCNC: 19 UMOL/L — SIGNIFICANT CHANGE UP (ref 11–55)
ANION GAP SERPL CALC-SCNC: 15 MMOL/L — SIGNIFICANT CHANGE UP (ref 5–17)
APPEARANCE UR: CLEAR — SIGNIFICANT CHANGE UP
APTT BLD: 31.9 SEC — SIGNIFICANT CHANGE UP (ref 27.5–37.4)
AST SERPL-CCNC: 20 U/L — SIGNIFICANT CHANGE UP
BACTERIA # UR AUTO: ABNORMAL
BILIRUB SERPL-MCNC: 0.4 MG/DL — SIGNIFICANT CHANGE UP (ref 0.4–2)
BILIRUB UR-MCNC: NEGATIVE — SIGNIFICANT CHANGE UP
BUN SERPL-MCNC: 14 MG/DL — SIGNIFICANT CHANGE UP (ref 8–20)
CALCIUM SERPL-MCNC: 9.4 MG/DL — SIGNIFICANT CHANGE UP (ref 8.6–10.2)
CHLORIDE SERPL-SCNC: 96 MMOL/L — LOW (ref 98–107)
CO2 SERPL-SCNC: 24 MMOL/L — SIGNIFICANT CHANGE UP (ref 22–29)
COLOR SPEC: YELLOW — SIGNIFICANT CHANGE UP
CREAT SERPL-MCNC: 0.61 MG/DL — SIGNIFICANT CHANGE UP (ref 0.5–1.3)
DIFF PNL FLD: ABNORMAL
EPI CELLS # UR: SIGNIFICANT CHANGE UP
GLUCOSE SERPL-MCNC: 116 MG/DL — HIGH (ref 70–115)
GLUCOSE UR QL: NEGATIVE MG/DL — SIGNIFICANT CHANGE UP
HCT VFR BLD CALC: 40.4 % — SIGNIFICANT CHANGE UP (ref 37–47)
HGB BLD-MCNC: 13.9 G/DL — SIGNIFICANT CHANGE UP (ref 12–16)
INR BLD: 0.96 RATIO — SIGNIFICANT CHANGE UP (ref 0.88–1.16)
KETONES UR-MCNC: NEGATIVE — SIGNIFICANT CHANGE UP
LEUKOCYTE ESTERASE UR-ACNC: ABNORMAL
MCHC RBC-ENTMCNC: 29.2 PG — SIGNIFICANT CHANGE UP (ref 27–31)
MCHC RBC-ENTMCNC: 34.4 G/DL — SIGNIFICANT CHANGE UP (ref 32–36)
MCV RBC AUTO: 84.9 FL — SIGNIFICANT CHANGE UP (ref 81–99)
NITRITE UR-MCNC: NEGATIVE — SIGNIFICANT CHANGE UP
PH UR: 7 — SIGNIFICANT CHANGE UP (ref 5–8)
PLATELET # BLD AUTO: 268 K/UL — SIGNIFICANT CHANGE UP (ref 150–400)
POTASSIUM SERPL-MCNC: 4.8 MMOL/L — SIGNIFICANT CHANGE UP (ref 3.5–5.3)
POTASSIUM SERPL-SCNC: 4.8 MMOL/L — SIGNIFICANT CHANGE UP (ref 3.5–5.3)
PROT SERPL-MCNC: 6.8 G/DL — SIGNIFICANT CHANGE UP (ref 6.6–8.7)
PROT UR-MCNC: 15 MG/DL
PROTHROM AB SERPL-ACNC: 10.5 SEC — SIGNIFICANT CHANGE UP (ref 9.8–12.7)
RBC # BLD: 4.76 M/UL — SIGNIFICANT CHANGE UP (ref 4.4–5.2)
RBC # FLD: 12.6 % — SIGNIFICANT CHANGE UP (ref 11–15.6)
RBC CASTS # UR COMP ASSIST: SIGNIFICANT CHANGE UP /HPF (ref 0–4)
SODIUM SERPL-SCNC: 135 MMOL/L — SIGNIFICANT CHANGE UP (ref 135–145)
SP GR SPEC: 1 — LOW (ref 1.01–1.02)
UROBILINOGEN FLD QL: NEGATIVE MG/DL — SIGNIFICANT CHANGE UP
WBC # BLD: 8.1 K/UL — SIGNIFICANT CHANGE UP (ref 4.8–10.8)
WBC # FLD AUTO: 8.1 K/UL — SIGNIFICANT CHANGE UP (ref 4.8–10.8)
WBC UR QL: ABNORMAL

## 2018-07-02 PROCEDURE — 93010 ELECTROCARDIOGRAM REPORT: CPT

## 2018-07-02 PROCEDURE — 99285 EMERGENCY DEPT VISIT HI MDM: CPT

## 2018-07-02 PROCEDURE — 71045 X-RAY EXAM CHEST 1 VIEW: CPT | Mod: 26

## 2018-07-02 RX ORDER — ACETAMINOPHEN 500 MG
650 TABLET ORAL ONCE
Qty: 0 | Refills: 0 | Status: COMPLETED | OUTPATIENT
Start: 2018-07-02 | End: 2018-07-02

## 2018-07-02 RX ORDER — CEFTRIAXONE 500 MG/1
1 INJECTION, POWDER, FOR SOLUTION INTRAMUSCULAR; INTRAVENOUS ONCE
Qty: 0 | Refills: 0 | Status: COMPLETED | OUTPATIENT
Start: 2018-07-02 | End: 2018-07-02

## 2018-07-02 RX ORDER — ALPRAZOLAM 0.25 MG
0.25 TABLET ORAL ONCE
Qty: 0 | Refills: 0 | Status: DISCONTINUED | OUTPATIENT
Start: 2018-07-02 | End: 2018-07-02

## 2018-07-02 RX ORDER — SODIUM CHLORIDE 9 MG/ML
3 INJECTION INTRAMUSCULAR; INTRAVENOUS; SUBCUTANEOUS ONCE
Qty: 0 | Refills: 0 | Status: COMPLETED | OUTPATIENT
Start: 2018-07-02 | End: 2018-07-02

## 2018-07-02 RX ADMIN — Medication 650 MILLIGRAM(S): at 23:53

## 2018-07-02 RX ADMIN — Medication 0.25 MILLIGRAM(S): at 23:53

## 2018-07-02 RX ADMIN — CEFTRIAXONE 100 GRAM(S): 500 INJECTION, POWDER, FOR SOLUTION INTRAMUSCULAR; INTRAVENOUS at 23:53

## 2018-07-02 RX ADMIN — SODIUM CHLORIDE 3 MILLILITER(S): 9 INJECTION INTRAMUSCULAR; INTRAVENOUS; SUBCUTANEOUS at 20:04

## 2018-07-02 NOTE — ED PROVIDER NOTE - OBJECTIVE STATEMENT
76 y/o F presents to ED c/o confusion and altered mental status today. Patient cannot recall why she is in the ED and is unable to answer questions.    As per family at bedside, the patient "has not been right for about 2 weeks". They note the patient has hx of dementia, and at baseline is forgetful and has intermittent mild aphasia, but never as severe as how altered she is now. The patient has had 3 falls over the past 2 weeks, the most recent was about 4 days ago, but did not have any complaints after the falls, was eating, drinking and moving at her baseline. After these falls, the patient is typically laying on the ground and laughing. Since yesterday, the patient has been getting progressively weaker, has been agitated and very restless. The family has not been able to  the patient when she needs to get off the toilet or out of bed because "she is so stiff". The patient has also had increasing urinary incontinence, has been hallucinating and saying things like "Did I kill my family" and "There is popcorn on the walls". As per the patient's niece the patient appeared "waxy", was dragging her R foot and her speech was garbled. 78 y/o F presents to ED c/o confusion and altered mental status today. Patient cannot recall why she is in the ED and is unable to answer questions.    As per family at bedside, the patient "has not been right for about 2 weeks". They note the patient has hx of dementia, and at baseline is forgetful and has intermittent mild aphasia, but never as severe as how altered she is now. The patient has had 3 falls over the past 2 weeks, the most recent was about 4 days ago, but did not have any complaints after the falls, was eating, drinking and moving at her baseline. After these falls, the patient is typically laying on the ground and laughing. Has not had any x-rays or CT scans following the falls. Since yesterday, the patient has been getting progressively weaker, has been agitated and very restless. The family has not been able to  the patient when she needs to get off the toilet or out of bed because "she is so stiff". The patient has also had increasing urinary incontinence, has been hallucinating and saying things like "Did I kill my family" and "There is popcorn on the walls". As per the patient's niece, yesterday, the patient appeared "waxy", was dragging her R foot. Today, around 1500, the patient had garbled speech and the family was unable to understand her. The family has been updating the patient's PCP, who ordered 2 UA's and blood work, but everything has come back negative. Dr. Fleming (the PCP) recommend the patient come to the ED to be admitted and further evaluated. 78 y/o F with PMHx of dementia presents to ED c/o confusion and altered mental status today. Patient cannot recall why she is in the ED and is unable to answer questions.    As per family at bedside, the patient "has not been right for about 2 weeks". They note the patient has hx of dementia, and at baseline is forgetful and has intermittent mild aphasia, but never as severe as how altered she is now. The patient has had 3 falls over the past 2 weeks, the most recent was about 4 days ago, but did not have any complaints after the falls (besides minor neck pain which resolved), was eating, drinking and moving at her baseline. After these falls, the patient is typically laying on the ground and laughing and was negative for LOC. Has not had any x-rays or CT scans following the falls. Since yesterday, the patient has been getting progressively weaker, has been agitated, not sleeping and very restless. The family has not been able to  the patient when she needs to get off the toilet or out of bed because "she is so stiff". The patient has also had increasing urinary incontinence, has been hallucinating and saying things like "Did I kill my family" and "There is popcorn on the walls". As per the patient's niece, yesterday, the patient appeared "waxy", was dragging her R foot. Today, around 1500, the patient had garbled speech and the family was unable to understand her. The family has been updating the patient's PCP, who ordered 2 UA's and blood work, but everything has come back negative. Dr. Fleming (the PCP) recommend the patient come to the ED to be admitted and further evaluated. Family states there have not been changes in her medications, and has no missed any doses recently.     Cardiology: Dr. Torrez ([facility])- has had normal echos 78 y/o F with PMHx of dementia presents to ED with confusion and altered mental status today. Patient cannot recall why she is in the ED and is unable to provide a reliable hx at this time.     As per family at bedside, the patient "has not been right for about 2 weeks". They note the patient has hx of dementia, and at baseline is forgetful and has intermittent mild aphasia, but never as severe as how altered she is now. The patient has had 3 falls over the past 2 weeks, the most recent was about 4 days ago, but did not have any complaints after the falls (besides minor neck pain which resolved), was eating, drinking and moving at her baseline. The family does note the patient has a large area of bruising over her R hip. After these falls, the patient is typically laying on the ground and laughing and was negative for LOC. Has not had any x-rays or CT scans following the falls. Since yesterday, the patient has been getting progressively weaker, has been agitated, not sleeping and very restless. The family has not been able to  the patient when she needs to get off the toilet or out of bed because "she is so stiff". The patient has also had increasing urinary incontinence, has been hallucinating and saying things like "Did I kill my family" and "There is popcorn on the walls". As per the patient's niece, yesterday, the patient appeared "waxy", was dragging her R foot. Today, around 1500, the patient had garbled speech and the family was unable to understand her. The family has been updating the patient's PCP, who ordered 2 UA's and blood work, but everything has come back negative. Dr. Fleming (the PCP) recommend the patient come to the ED to be admitted and further evaluated. Family states there have not been changes in her medications, and has no missed any doses recently.     Cardiology: Dr. 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 Patient cannot recall why she is in the ED and is unable to provide a reliable hx at this time.     As per family at bedside, the patient "has not been right for about 2 weeks". They note the patient has hx of dementia, and at baseline is forgetful and has intermittent mild aphasia, but never as severe as how altered she is now. The patient has had 3 falls over the past 2 weeks, the most recent was about 4 days ago, but did not have any complaints after the falls (besides minor neck pain), was eating, drinking and moving at her baseline. The family does note the patient has a large area of bruising over her R hip. After these falls, the patient is typically laying on the ground and laughing and was negative for LOC. Has not had any x-rays or CT scans following the falls. Since yesterday, the patient has been getting progressively weaker, has been agitated, not sleeping and very restless. The family has not been able to  the patient when she needs to get off the toilet or out of bed because "she is so stiff". The patient has also had increasing urinary incontinence, has been hallucinating and saying things like "Did I kill my family" and "There is popcorn on the walls". As per the patient's niece, yesterday, the patient appeared "waxy", was dragging her R foot. Today, around 1500, the patient had garbled speech and the family was unable to understand her. The family has been updating the patient's PCP, who ordered 2 UA's and blood work, but everything has come back negative. Dr. Fleming (the PCP) recommend the patient come to the ED to be admitted and further evaluated. Family states there have not been changes in her medications, and has no missed any doses recently.     Cardiology: Dr. Torrez ([facility])- has had normal echos

## 2018-07-02 NOTE — ED ADULT NURSE NOTE - OBJECTIVE STATEMENT
Pt received in [address] A&Ox0 and as per family is not at her baseline dementia. Pt unable to answer any questions asked of her but is noted to be able to MAEx4 with equal strength and purpose with clear speech. As per niece, pt has been off her baseline since 2 weeks ago and has sustained 3 falls in that time. Pt noted to have ecchymotic area to R flank and is c/o neck pain. Daughter states pt had "garbled" speech on Sunday and also a r foot drop and R lip droop, which is not evident during RN assessment. Dr. Gore called to bedside to eval pt. It is also noted pt has a history of recurrent UTI's.

## 2018-07-02 NOTE — ED PROVIDER NOTE - UNABLE TO OBTAIN
Patient has Hx of dementia presenting with AMS Dementia Review of systems is limited due to patient's hx of dementia and presenting with AMS

## 2018-07-02 NOTE — ED PROVIDER NOTE - PROGRESS NOTE DETAILS
Patient's onset of symptoms today was noted by her aide at home. Last seen normal by family at new baseline was prior to 1300 today.

## 2018-07-02 NOTE — ED ADULT TRIAGE NOTE - CHIEF COMPLAINT QUOTE
pt BIBA with confusion, has hx of dementia and chronic UTI as per family, pt states it burns when she urinates.

## 2018-07-02 NOTE — ED PROVIDER NOTE - MEDICAL DECISION MAKING DETAILS
Patient with worsening confusion and functioning at home, unsure of cause, possible secondary to acute delirium or recurrent TIA/CVA, Patient with worsening confusion and functioning at home, unsure of cause, possible secondary to acute delirium or recurrent TIA/CVA.

## 2018-07-03 DIAGNOSIS — R41.0 DISORIENTATION, UNSPECIFIED: ICD-10-CM

## 2018-07-03 LAB
CULTURE RESULTS: SIGNIFICANT CHANGE UP
SPECIMEN SOURCE: SIGNIFICANT CHANGE UP

## 2018-07-03 PROCEDURE — 99222 1ST HOSP IP/OBS MODERATE 55: CPT

## 2018-07-03 PROCEDURE — 74177 CT ABD & PELVIS W/CONTRAST: CPT | Mod: 26

## 2018-07-03 PROCEDURE — 73501 X-RAY EXAM HIP UNI 1 VIEW: CPT | Mod: 26,76,RT

## 2018-07-03 PROCEDURE — 72125 CT NECK SPINE W/O DYE: CPT | Mod: 26

## 2018-07-03 PROCEDURE — 71260 CT THORAX DX C+: CPT | Mod: 26

## 2018-07-03 PROCEDURE — 70450 CT HEAD/BRAIN W/O DYE: CPT | Mod: 26

## 2018-07-03 RX ORDER — DOCUSATE SODIUM 100 MG
100 CAPSULE ORAL DAILY
Qty: 0 | Refills: 0 | Status: DISCONTINUED | OUTPATIENT
Start: 2018-07-03 | End: 2018-07-06

## 2018-07-03 RX ORDER — MEMANTINE HYDROCHLORIDE 10 MG/1
10 TABLET ORAL
Qty: 0 | Refills: 0 | Status: DISCONTINUED | OUTPATIENT
Start: 2018-07-03 | End: 2018-07-06

## 2018-07-03 RX ORDER — CARBIDOPA AND LEVODOPA 25; 100 MG/1; MG/1
1 TABLET ORAL THREE TIMES A DAY
Qty: 0 | Refills: 0 | Status: DISCONTINUED | OUTPATIENT
Start: 2018-07-03 | End: 2018-07-03

## 2018-07-03 RX ORDER — ALPRAZOLAM 0.25 MG
0.25 TABLET ORAL THREE TIMES A DAY
Qty: 0 | Refills: 0 | Status: DISCONTINUED | OUTPATIENT
Start: 2018-07-03 | End: 2018-07-03

## 2018-07-03 RX ORDER — ACETAMINOPHEN 500 MG
650 TABLET ORAL EVERY 6 HOURS
Qty: 0 | Refills: 0 | Status: DISCONTINUED | OUTPATIENT
Start: 2018-07-03 | End: 2018-07-06

## 2018-07-03 RX ORDER — ALPRAZOLAM 0.25 MG
0.25 TABLET ORAL
Qty: 0 | Refills: 0 | Status: DISCONTINUED | OUTPATIENT
Start: 2018-07-03 | End: 2018-07-06

## 2018-07-03 RX ORDER — LACTOBACILLUS ACIDOPHILUS 100MM CELL
1 CAPSULE ORAL
Qty: 0 | Refills: 0 | Status: DISCONTINUED | OUTPATIENT
Start: 2018-07-03 | End: 2018-07-06

## 2018-07-03 RX ORDER — ONDANSETRON 8 MG/1
4 TABLET, FILM COATED ORAL EVERY 6 HOURS
Qty: 0 | Refills: 0 | Status: DISCONTINUED | OUTPATIENT
Start: 2018-07-03 | End: 2018-07-06

## 2018-07-03 RX ORDER — SODIUM CHLORIDE 9 MG/ML
1000 INJECTION INTRAMUSCULAR; INTRAVENOUS; SUBCUTANEOUS
Qty: 0 | Refills: 0 | Status: DISCONTINUED | OUTPATIENT
Start: 2018-07-03 | End: 2018-07-04

## 2018-07-03 RX ORDER — PHENAZOPYRIDINE HCL 100 MG
100 TABLET ORAL EVERY 8 HOURS
Qty: 0 | Refills: 0 | Status: DISCONTINUED | OUTPATIENT
Start: 2018-07-03 | End: 2018-07-04

## 2018-07-03 RX ORDER — CARBIDOPA AND LEVODOPA 25; 100 MG/1; MG/1
1 TABLET ORAL
Qty: 0 | Refills: 0 | COMMUNITY

## 2018-07-03 RX ORDER — DENOSUMAB 60 MG/ML
0 INJECTION SUBCUTANEOUS
Qty: 0 | Refills: 0 | COMMUNITY

## 2018-07-03 RX ORDER — CALCIUM ACETATE 667 MG
667 TABLET ORAL
Qty: 0 | Refills: 0 | Status: DISCONTINUED | OUTPATIENT
Start: 2018-07-03 | End: 2018-07-06

## 2018-07-03 RX ORDER — ENOXAPARIN SODIUM 100 MG/ML
40 INJECTION SUBCUTANEOUS DAILY
Qty: 0 | Refills: 0 | Status: DISCONTINUED | OUTPATIENT
Start: 2018-07-04 | End: 2018-07-06

## 2018-07-03 RX ORDER — ASPIRIN/CALCIUM CARB/MAGNESIUM 324 MG
81 TABLET ORAL DAILY
Qty: 0 | Refills: 0 | Status: DISCONTINUED | OUTPATIENT
Start: 2018-07-03 | End: 2018-07-06

## 2018-07-03 RX ORDER — CEFTRIAXONE 500 MG/1
1 INJECTION, POWDER, FOR SOLUTION INTRAMUSCULAR; INTRAVENOUS EVERY 24 HOURS
Qty: 0 | Refills: 0 | Status: DISCONTINUED | OUTPATIENT
Start: 2018-07-03 | End: 2018-07-06

## 2018-07-03 RX ORDER — MORPHINE SULFATE 50 MG/1
2 CAPSULE, EXTENDED RELEASE ORAL EVERY 6 HOURS
Qty: 0 | Refills: 0 | Status: DISCONTINUED | OUTPATIENT
Start: 2018-07-03 | End: 2018-07-06

## 2018-07-03 RX ADMIN — MEMANTINE HYDROCHLORIDE 10 MILLIGRAM(S): 10 TABLET ORAL at 18:10

## 2018-07-03 RX ADMIN — Medication 100 MILLIGRAM(S): at 14:31

## 2018-07-03 RX ADMIN — Medication 1 TABLET(S): at 17:27

## 2018-07-03 RX ADMIN — MORPHINE SULFATE 2 MILLIGRAM(S): 50 CAPSULE, EXTENDED RELEASE ORAL at 21:00

## 2018-07-03 RX ADMIN — SODIUM CHLORIDE 75 MILLILITER(S): 9 INJECTION INTRAMUSCULAR; INTRAVENOUS; SUBCUTANEOUS at 17:03

## 2018-07-03 RX ADMIN — Medication 0.25 MILLIGRAM(S): at 06:18

## 2018-07-03 RX ADMIN — Medication 0.5 MILLIGRAM(S): at 10:41

## 2018-07-03 RX ADMIN — CEFTRIAXONE 100 GRAM(S): 500 INJECTION, POWDER, FOR SOLUTION INTRAMUSCULAR; INTRAVENOUS at 12:54

## 2018-07-03 RX ADMIN — ONDANSETRON 4 MILLIGRAM(S): 8 TABLET, FILM COATED ORAL at 12:53

## 2018-07-03 RX ADMIN — Medication 1 TABLET(S): at 10:19

## 2018-07-03 RX ADMIN — MEMANTINE HYDROCHLORIDE 10 MILLIGRAM(S): 10 TABLET ORAL at 06:18

## 2018-07-03 RX ADMIN — Medication 100 MILLIGRAM(S): at 22:29

## 2018-07-03 RX ADMIN — MORPHINE SULFATE 2 MILLIGRAM(S): 50 CAPSULE, EXTENDED RELEASE ORAL at 12:53

## 2018-07-03 RX ADMIN — Medication 100 MILLIGRAM(S): at 20:46

## 2018-07-03 RX ADMIN — MORPHINE SULFATE 2 MILLIGRAM(S): 50 CAPSULE, EXTENDED RELEASE ORAL at 20:46

## 2018-07-03 RX ADMIN — Medication 81 MILLIGRAM(S): at 12:54

## 2018-07-03 NOTE — H&P ADULT - NSHPPHYSICALEXAM_GEN_ALL_CORE
PHYSICAL EXAM:    Vital Signs Last 24 Hrs  T(C): 36.1 (03 Jul 2018 03:18), Max: 37.2 (02 Jul 2018 17:32)  T(F): 97 (03 Jul 2018 03:18), Max: 98.9 (02 Jul 2018 17:32)  HR: 69 (03 Jul 2018 03:18) (69 - 90)  BP: 127/66 (03 Jul 2018 03:18) (127/66 - 166/90)  BP(mean): --  RR: 18 (03 Jul 2018 03:18) (18 - 18)  SpO2: 99% (03 Jul 2018 03:18) (98% - 99%)    GENERAL: Pt lying comfortably, NAD.  ENMT: PERRL, +EOMI.  NECK: soft, Supple, No JVD,   CHEST/LUNG: Clear to auscultation bilaterally; No wheezing.  HEART: S1S2+, Regular rate and rhythm; No murmurs.  ABDOMEN: Soft, Nontender, Nondistended; Bowel sounds present.  MUSCULOSKELETAL: Normal range of motion.  SKIN: No rashes or lesions.  NEURO: Awake, responsive but confused.   PSYCH: normal mood. PHYSICAL EXAM:    Vital Signs Last 24 Hrs  T(C): 36.1 (03 Jul 2018 03:18), Max: 37.2 (02 Jul 2018 17:32)  T(F): 97 (03 Jul 2018 03:18), Max: 98.9 (02 Jul 2018 17:32)  HR: 69 (03 Jul 2018 03:18) (69 - 90)  BP: 127/66 (03 Jul 2018 03:18) (127/66 - 166/90)  BP(mean): --  RR: 18 (03 Jul 2018 03:18) (18 - 18)  SpO2: 99% (03 Jul 2018 03:18) (98% - 99%)    GENERAL: Pt lying comfortably, NAD.  ENMT: PERRL, +EOMI.  NECK: soft, Supple, No JVD,   CHEST/LUNG: Clear to auscultation bilaterally; No wheezing.  HEART: S1S2+, Regular rate and rhythm; No murmurs.  ABDOMEN: Soft, Nontender, Nondistended; Bowel sounds present.  MUSCULOSKELETAL: Normal range of motion.  SKIN: No rashes or lesions.  NEURO: Awake, responsive but confused, not oriented to place /person /time. DOes follow simple commands.   PSYCH: normal mood.

## 2018-07-03 NOTE — H&P ADULT - NSHPLABSRESULTS_GEN_ALL_CORE
LABS:                        13.9   8.1   )-----------( 268      ( 2018 20:21 )             40.4     07-02    135  |  96<L>  |  14.0  ----------------------------<  116<H>  4.8   |  24.0  |  0.61    Ca    9.4      2018 20:21  Mg     2.3     07-    TPro  6.8  /  Alb  4.0  /  TBili  0.4  /  DBili  x   /  AST  20  /  ALT  20  /  AlkPhos  47  07-02    PT/INR - ( 2018 21:20 )   PT: 10.5 sec;   INR: 0.96 ratio         PTT - ( 2018 21:20 )  PTT:31.9 sec    LIVER FUNCTIONS - ( 2018 20:21 )  Alb: 4.0 g/dL / Pro: 6.8 g/dL / ALK PHOS: 47 U/L / ALT: 20 U/L / AST: 20 U/L / GGT: x           Urinalysis Basic - ( 2018 20:38 )    Color: Yellow / Appearance: Clear / S.005 / pH: x  Gluc: x / Ketone: Negative  / Bili: Negative / Urobili: Negative mg/dL   Blood: x / Protein: 15 mg/dL / Nitrite: Negative   Leuk Esterase: Moderate / RBC: 0-2 /HPF / WBC 26-50   Sq Epi: x / Non Sq Epi: Few / Bacteria: Occasional        CARDIAC MARKERS ( 2018 21:08 )  x     / <0.01 ng/mL / 54 U/L / x     / x

## 2018-07-03 NOTE — ED ADULT NURSE REASSESSMENT NOTE - NS ED NURSE REASSESS COMMENT FT1
received report from sandi zhou rn and assumed care of pt. pt sitting calm in bed. a and o to self. breathing even and unlabored. nsr on cm. pt has no complaints at this time. will continue to monitor.

## 2018-07-03 NOTE — H&P ADULT - ASSESSMENT
Pt is 76 y/o female with PMHx of dementia, Parkinson's disease, osteoporosis, osteoarthritis, Sjogren syndrome, Raynaud's, was brought into ER by family with confusion, AMS. not eating or drinking well, increased urination and hallucinating with multiple falls over the past weeks. In ER UA shows infection with + LE, WBC, bacteria, CT chest /Head /spine with no acute finding. Admitted for possible UTI    AMS likely secondary to UTI with underlying dementia:  - Keep on fall /aspiration precaution with enhanced supervision.  - S/p Rocephin in ER, will c/w it, add probiotics. IVFs, Tylenol prn.  - F/u urine culture.    Mechanical fall:  - Likely due to weakness.  - CT head /spine with no acute finding.  - Check Hip xray  - PT eval.    >H/o Dementia- Supportive care, resume Memantine.   >H/o Parkinson's- Resume home meds Sinemet  >H/o OA, Osteoporosis- Tylenol prn, on Prolia outpatient.  >DVT ppx- Pt is 78 y/o female with PMHx of dementia, Parkinson's disease, osteoporosis, osteoarthritis, Sjogren syndrome, Raynaud's, was brought into ER by family with worsening confusion, AMS, increased urination and hallucinating with multiple falls over the past weeks. In ER UA shows infection with + LE, WBC, bacteria, CT chest /Head /spine with no acute finding. Admitted for possible UTI    AMS likely secondary to UTI with underlying dementia:  - Pt has baseline dementia, now worsening mental status likely due to UTI  - Keep on fall /aspiration precaution with enhanced supervision.  - S/p Rocephin in ER, will c/w it, add probiotics. IVFs, Tylenol prn.  - F/u urine culture.    Mechanical fall with weakness:  - CT head /spine with no acute finding.  - Check Hip xray  - PT eval.    >H/o Dementia- Supportive care, resume Memantine.   >H/o Parkinson's- Pt not on any meds at home, per daughter Pt used to be on sinemet but was stopped in past due to worsening confusion with sinemet.   >H/o OA, Osteoporosis- Tylenol prn, on Prolia outpatient.  >DVT ppx- Pt is 78 y/o female with PMHx of dementia, Parkinson's disease, osteoporosis, osteoarthritis, Sjogren syndrome, Raynaud's, was brought into ER by family with worsening confusion, AMS, increased urination and hallucinating with multiple falls over the past weeks. In ER UA shows infection with + LE, WBC, bacteria, CT chest /Head /spine with no acute finding. Admitted for possible UTI    AMS likely secondary to UTI with underlying dementia:  - Pt has baseline dementia, now worsening mental status likely due to UTI  - Keep on fall /aspiration precaution with enhanced supervision.  - S/p Rocephin in ER, will c/w it, add probiotics. IVFs, Tylenol prn.  - F/u urine culture.    Mechanical fall with weakness:  - CT head /spine with no acute finding.  - Check Hip xray  - PT eval.    >H/o Dementia- Supportive care, resume Memantine. 1:1 for safety  >H/o Parkinson's- Pt not on any meds at home, per daughter Pt used to be on sinemet but was stopped in past due to worsening confusion with sinemet.   >H/o OA, Osteoporosis- Tylenol prn, on Prolia outpatient.  >DVT ppx- Lovenox.

## 2018-07-03 NOTE — H&P ADULT - HISTORY OF PRESENT ILLNESS
Pt is 76 y/o female with PMHx of dementia, Parkinson's disease, osteoporosis, osteoarthritis, Sjogren syndrome, Raynaud's, was brought into ER by family with confusion and AMS. Pt not been feeling well for last 2 weeks, she is being more altered, weak, not eating or drinking well, increased urination and hallucinating. Pt also had multiple falls over the past weeks- after the fall Pt typically laying on ground and not complaining of anything.  The family has been updating the patient's PCP Dr Ziegler, who ordered 2 UA's and blood work, but everything has come back negative, PCP recommend the patient come to the ED to be admitted and further evaluated. Pt poor historian due to dementia and AMS, history obtained from daughter Maris at bedside. Pt is 78 y/o female with PMHx of dementia, Parkinson's disease, osteoporosis, osteoarthritis, Sjogren syndrome, Raynaud's, was brought into ER by family with worsening confusion and AMS. Pt not been feeling well for last 2 weeks, she is being more altered / confused, weak with increased urination and hallucinating. Pt also had 3 falls over the past weeks- after the fall Pt typically laying on ground and not complaining of anything and difficulty getting up due to weakness. Daughter reports Pt eating /drinking well, no reported fever, chills, nausea, vomiting, chest pain, SOB. The family has been updating the patient's PCP Dr Ziegler, who ordered 2 UA's and blood work, but everything has come back negative, PCP recommend the patient come to the ED to be admitted and further evaluated.

## 2018-07-04 DIAGNOSIS — M35.00 SJOGREN SYNDROME, UNSPECIFIED: ICD-10-CM

## 2018-07-04 DIAGNOSIS — N39.0 URINARY TRACT INFECTION, SITE NOT SPECIFIED: ICD-10-CM

## 2018-07-04 DIAGNOSIS — R41.0 DISORIENTATION, UNSPECIFIED: ICD-10-CM

## 2018-07-04 RX ORDER — ALPRAZOLAM 0.25 MG
0.25 TABLET ORAL ONCE
Qty: 0 | Refills: 0 | Status: DISCONTINUED | OUTPATIENT
Start: 2018-07-04 | End: 2018-07-04

## 2018-07-04 RX ORDER — SODIUM CHLORIDE 9 MG/ML
1000 INJECTION, SOLUTION INTRAVENOUS
Qty: 0 | Refills: 0 | Status: DISCONTINUED | OUTPATIENT
Start: 2018-07-04 | End: 2018-07-05

## 2018-07-04 RX ADMIN — MEMANTINE HYDROCHLORIDE 10 MILLIGRAM(S): 10 TABLET ORAL at 17:18

## 2018-07-04 RX ADMIN — ENOXAPARIN SODIUM 40 MILLIGRAM(S): 100 INJECTION SUBCUTANEOUS at 11:43

## 2018-07-04 RX ADMIN — Medication 0.25 MILLIGRAM(S): at 05:39

## 2018-07-04 RX ADMIN — SODIUM CHLORIDE 75 MILLILITER(S): 9 INJECTION INTRAMUSCULAR; INTRAVENOUS; SUBCUTANEOUS at 08:47

## 2018-07-04 RX ADMIN — Medication 81 MILLIGRAM(S): at 11:43

## 2018-07-04 RX ADMIN — MEMANTINE HYDROCHLORIDE 10 MILLIGRAM(S): 10 TABLET ORAL at 05:39

## 2018-07-04 RX ADMIN — Medication 1 TABLET(S): at 08:47

## 2018-07-04 RX ADMIN — Medication 100 MILLIGRAM(S): at 05:39

## 2018-07-04 RX ADMIN — MORPHINE SULFATE 2 MILLIGRAM(S): 50 CAPSULE, EXTENDED RELEASE ORAL at 15:15

## 2018-07-04 RX ADMIN — Medication 1 TABLET(S): at 17:18

## 2018-07-04 RX ADMIN — Medication 650 MILLIGRAM(S): at 19:43

## 2018-07-04 RX ADMIN — Medication 0.25 MILLIGRAM(S): at 17:18

## 2018-07-04 RX ADMIN — MORPHINE SULFATE 2 MILLIGRAM(S): 50 CAPSULE, EXTENDED RELEASE ORAL at 23:15

## 2018-07-04 RX ADMIN — Medication 100 MILLIGRAM(S): at 14:40

## 2018-07-04 RX ADMIN — Medication 100 MILLIGRAM(S): at 11:43

## 2018-07-04 RX ADMIN — MORPHINE SULFATE 2 MILLIGRAM(S): 50 CAPSULE, EXTENDED RELEASE ORAL at 22:55

## 2018-07-04 RX ADMIN — MORPHINE SULFATE 2 MILLIGRAM(S): 50 CAPSULE, EXTENDED RELEASE ORAL at 14:40

## 2018-07-04 RX ADMIN — Medication 667 MILLIGRAM(S): at 17:18

## 2018-07-04 RX ADMIN — Medication 0.25 MILLIGRAM(S): at 00:55

## 2018-07-04 RX ADMIN — SODIUM CHLORIDE 50 MILLILITER(S): 9 INJECTION, SOLUTION INTRAVENOUS at 17:17

## 2018-07-04 RX ADMIN — CEFTRIAXONE 100 GRAM(S): 500 INJECTION, POWDER, FOR SOLUTION INTRAMUSCULAR; INTRAVENOUS at 11:42

## 2018-07-05 ENCOUNTER — TRANSCRIPTION ENCOUNTER (OUTPATIENT)
Age: 78
End: 2018-07-05

## 2018-07-05 DIAGNOSIS — E11.9 TYPE 2 DIABETES MELLITUS WITHOUT COMPLICATIONS: ICD-10-CM

## 2018-07-05 DIAGNOSIS — M25.551 PAIN IN RIGHT HIP: ICD-10-CM

## 2018-07-05 LAB
ANION GAP SERPL CALC-SCNC: 10 MMOL/L — SIGNIFICANT CHANGE UP (ref 5–17)
BUN SERPL-MCNC: 8 MG/DL — SIGNIFICANT CHANGE UP (ref 8–20)
CALCIUM SERPL-MCNC: 7.8 MG/DL — LOW (ref 8.6–10.2)
CHLORIDE SERPL-SCNC: 102 MMOL/L — SIGNIFICANT CHANGE UP (ref 98–107)
CO2 SERPL-SCNC: 23 MMOL/L — SIGNIFICANT CHANGE UP (ref 22–29)
CREAT SERPL-MCNC: 0.45 MG/DL — LOW (ref 0.5–1.3)
GLUCOSE SERPL-MCNC: 141 MG/DL — HIGH (ref 70–115)
POTASSIUM SERPL-MCNC: 4.4 MMOL/L — SIGNIFICANT CHANGE UP (ref 3.5–5.3)
POTASSIUM SERPL-SCNC: 4.4 MMOL/L — SIGNIFICANT CHANGE UP (ref 3.5–5.3)
SODIUM SERPL-SCNC: 135 MMOL/L — SIGNIFICANT CHANGE UP (ref 135–145)

## 2018-07-05 PROCEDURE — 72192 CT PELVIS W/O DYE: CPT | Mod: 26

## 2018-07-05 PROCEDURE — 76377 3D RENDER W/INTRP POSTPROCES: CPT | Mod: 26

## 2018-07-05 RX ORDER — METOPROLOL TARTRATE 50 MG
25 TABLET ORAL
Qty: 0 | Refills: 0 | Status: DISCONTINUED | OUTPATIENT
Start: 2018-07-05 | End: 2018-07-06

## 2018-07-05 RX ORDER — SODIUM CHLORIDE 9 MG/ML
1000 INJECTION, SOLUTION INTRAVENOUS
Qty: 0 | Refills: 0 | Status: DISCONTINUED | OUTPATIENT
Start: 2018-07-05 | End: 2018-07-06

## 2018-07-05 RX ORDER — MAGNESIUM HYDROXIDE 400 MG/1
30 TABLET, CHEWABLE ORAL DAILY
Qty: 0 | Refills: 0 | Status: DISCONTINUED | OUTPATIENT
Start: 2018-07-05 | End: 2018-07-06

## 2018-07-05 RX ORDER — CLOTRIMAZOLE AND BETAMETHASONE DIPROPIONATE 10; .5 MG/G; MG/G
1 CREAM TOPICAL
Qty: 0 | Refills: 0 | Status: DISCONTINUED | OUTPATIENT
Start: 2018-07-05 | End: 2018-07-06

## 2018-07-05 RX ORDER — PANTOPRAZOLE SODIUM 20 MG/1
40 TABLET, DELAYED RELEASE ORAL
Qty: 0 | Refills: 0 | Status: DISCONTINUED | OUTPATIENT
Start: 2018-07-05 | End: 2018-07-06

## 2018-07-05 RX ADMIN — Medication 0.25 MILLIGRAM(S): at 05:33

## 2018-07-05 RX ADMIN — CLOTRIMAZOLE AND BETAMETHASONE DIPROPIONATE 1 APPLICATION(S): 10; .5 CREAM TOPICAL at 17:16

## 2018-07-05 RX ADMIN — MORPHINE SULFATE 2 MILLIGRAM(S): 50 CAPSULE, EXTENDED RELEASE ORAL at 22:18

## 2018-07-05 RX ADMIN — ONDANSETRON 4 MILLIGRAM(S): 8 TABLET, FILM COATED ORAL at 22:18

## 2018-07-05 RX ADMIN — Medication 100 MILLIGRAM(S): at 11:38

## 2018-07-05 RX ADMIN — MAGNESIUM HYDROXIDE 30 MILLILITER(S): 400 TABLET, CHEWABLE ORAL at 12:28

## 2018-07-05 RX ADMIN — MEMANTINE HYDROCHLORIDE 10 MILLIGRAM(S): 10 TABLET ORAL at 05:33

## 2018-07-05 RX ADMIN — Medication 0.25 MILLIGRAM(S): at 17:16

## 2018-07-05 RX ADMIN — MEMANTINE HYDROCHLORIDE 10 MILLIGRAM(S): 10 TABLET ORAL at 17:16

## 2018-07-05 RX ADMIN — MORPHINE SULFATE 2 MILLIGRAM(S): 50 CAPSULE, EXTENDED RELEASE ORAL at 22:48

## 2018-07-05 RX ADMIN — ENOXAPARIN SODIUM 40 MILLIGRAM(S): 100 INJECTION SUBCUTANEOUS at 11:38

## 2018-07-05 RX ADMIN — CEFTRIAXONE 100 GRAM(S): 500 INJECTION, POWDER, FOR SOLUTION INTRAMUSCULAR; INTRAVENOUS at 11:39

## 2018-07-05 RX ADMIN — Medication 81 MILLIGRAM(S): at 11:38

## 2018-07-05 RX ADMIN — Medication 1 TABLET(S): at 17:16

## 2018-07-05 RX ADMIN — Medication 25 MILLIGRAM(S): at 22:17

## 2018-07-05 RX ADMIN — Medication 667 MILLIGRAM(S): at 17:16

## 2018-07-05 NOTE — CONSULT NOTE ADULT - SUBJECTIVE AND OBJECTIVE BOX
Dr. Ziegler called the Orthopedic Phone earlier in the afternoon after patient of his has been complaining of Right hip pain for the last few days.  Patient is a 77 year old female who orginally came to the hospital for difficulty walking.  At home patient normally ambulates without any assistive devises by prior to admission was have difficulty walking.  X-rays taken on admission show hip in good position without any fractures.  However, approximately 2 days ago patient started complaining of pain to her right hip.  She is unable to remember what occured.  Patient also has history or some dementia.  Family member was at bedside helping with history.  So a C-T scan was ordered today which, showed the Right hip to be dislocated.  The patient has a Right total hip replacement that was done approximately 8 years ago.  Patient will need to go to the OR for conscious sedation and relocation of hip.      PMHx:  Dementia    DM (diabetes mellitus)    UTI (urinary tract infection)    PSHx:  Hip Arthroplasty    Allergies:  Sulfa Drugs    Review of Systems:  Muscular Skeletal: Right Hip Pain  Remaining systems were reviewed and found to be negative from the history obtained    Physical Exam:  Vital Signs Last 24 Hrs  T(C): 36.7 (05 Jul 2018 22:16), Max: 36.8 (05 Jul 2018 11:34)  T(F): 98 (05 Jul 2018 22:16), Max: 98.3 (05 Jul 2018 11:34)  HR: 96 (05 Jul 2018 22:16) (83 - 111)  BP: 158/82 (05 Jul 2018 22:16) (141/75 - 159/76)  BP(mean): --  RR: 18 (05 Jul 2018 22:16) (18 - 20)  SpO2: 96% (05 Jul 2018 22:16) (95% - 96%)    Right Lower extremity:  + Shortening, + External Rotation  2+ pedal pulse  + Sensation  compartments soft, non-tender  + ROM of toes    CT Scan Hip/Pelvis  EXAM:  CT PELVIS BONY ONLY                         EXAM:  CT 3D RECONSTRUCT W RICHIE                          PROCEDURE DATE:  07/05/2018          INTERPRETATION:  Non contrast CT pelvis     COMPARISON: Radiograph 7/3/2018 and CT of chest abdomen and pelvis   7/3/2018..    CLINICAL HISTORY: Right greater than left hip pain..    Technique: contiguous axial images were obtained with 5.0 mm slice   thickness without intravenous contrast administration.  Coronal and sagittal reformats were alsosubmitted for interpretation.   3-D imaging was created and interpreted.      FINDINGS:   There is a posterior dislocation of right prosthetic femoral head from   acetabular cup without fracture deformity . the osseous pelvis and left   hip are intact . sacrum and sacroiliac joints are intact. The  There is degenerative narrowing of the L4-5-S1 disc space. There is   moderate stool within the rectosigmoid colon.  Presacral soft tissue edema. . Right inguinal hernia noted containing   loops of nonobstructed bowel.  IMPRESSION:     Posterior dislocation of the prosthetic right femoral head from   acetabular cup. No fracture.                 JANICE CARTER M.D., ATTENDING RADIOLOGIST  This document has been electronically signed. Jul 5 2018  5:21PM      A/P: Right Total Hip Dislocation  - Patient will need to have conscious sedation to relocate hip  - Pain medication as needed for comfort  - Discussed with patient's family and Dr. Zieglre  - Dr. Ziegler will put note on chart to say patient can have sedation  - NPO  - Reviewed with Dr. Goetz Dr. Ziegler called the Orthopedic Phone earlier in the afternoon after patient of his has been complaining of Right hip pain for the last few days.  Patient is a 77 year old female who orginally came to the hospital for difficulty walking.  At home patient normally ambulates without any assistive devises by prior to admission was have difficulty walking.  X-rays taken on admission show hip in good position without any fractures.  However, approximately 2 days ago patient started complaining of pain to her right hip.  She is unable to remember what occured.  Patient also has history or some dementia.  Family member was at bedside helping with history.  So a C-T scan was ordered today which, showed the Right hip to be dislocated.  The patient has a Right total hip replacement that was done approximately 8 years ago.  Patient will need to go to the OR for conscious sedation and relocation of hip.      PMHx:  Dementia    DM (diabetes mellitus)    UTI (urinary tract infection)    PSHx:  Hip Arthroplasty    Allergies:  Sulfa Drugs    Review of Systems:  Muscular Skeletal: Right Hip Pain  Remaining systems were reviewed and found to be negative from the history obtained    Physical Exam:  Vital Signs Last 24 Hrs  T(C): 36.7 (05 Jul 2018 22:16), Max: 36.8 (05 Jul 2018 11:34)  T(F): 98 (05 Jul 2018 22:16), Max: 98.3 (05 Jul 2018 11:34)  HR: 96 (05 Jul 2018 22:16) (83 - 111)  BP: 158/82 (05 Jul 2018 22:16) (141/75 - 159/76)  BP(mean): --  RR: 18 (05 Jul 2018 22:16) (18 - 20)  SpO2: 96% (05 Jul 2018 22:16) (95% - 96%)    Right Lower extremity:  + Shortening, + External Rotation  2+ pedal pulse  + Sensation  compartments soft, non-tender  + ROM of toes    All other extremities normal    CT Scan Hip/Pelvis  EXAM:  CT PELVIS BONY ONLY                         EXAM:  CT 3D RECONSTRUCT W RICHIE                          PROCEDURE DATE:  07/05/2018          INTERPRETATION:  Non contrast CT pelvis     COMPARISON: Radiograph 7/3/2018 and CT of chest abdomen and pelvis   7/3/2018..    CLINICAL HISTORY: Right greater than left hip pain..    Technique: contiguous axial images were obtained with 5.0 mm slice   thickness without intravenous contrast administration.  Coronal and sagittal reformats were alsosubmitted for interpretation.   3-D imaging was created and interpreted.      FINDINGS:   There is a posterior dislocation of right prosthetic femoral head from   acetabular cup without fracture deformity . the osseous pelvis and left   hip are intact . sacrum and sacroiliac joints are intact. The  There is degenerative narrowing of the L4-5-S1 disc space. There is   moderate stool within the rectosigmoid colon.  Presacral soft tissue edema. . Right inguinal hernia noted containing   loops of nonobstructed bowel.  IMPRESSION:     Posterior dislocation of the prosthetic right femoral head from   acetabular cup. No fracture.                 JANICE CARTER M.D., ATTENDING RADIOLOGIST  This document has been electronically signed. Jul 5 2018  5:21PM      A/P: Right Total Hip Dislocation  - Patient will need to have conscious sedation to relocate hip  - Pain medication as needed for comfort  - Discussed with patient's family and Dr. Ziegler  - Dr. Ziegler will put note on chart to say patient can have sedation  - NPO  - Reviewed with Dr. Goetz

## 2018-07-05 NOTE — PHYSICAL THERAPY INITIAL EVALUATION ADULT - PERTINENT HX OF CURRENT PROBLEM, REHAB EVAL
As per H&P: Pt poor historian due to dementia and AMS, history obtained from daughter Maris at bedside. Pt is 78 y/o female with PMHx of dementia, Parkinson's disease, osteoporosis, osteoarthritis, Sjogren syndrome, Raynaud's, was brought into ER by family with worsening confusion and AMS.

## 2018-07-05 NOTE — PHYSICAL THERAPY INITIAL EVALUATION ADULT - CRITERIA FOR SKILLED THERAPEUTIC INTERVENTIONS
anticipated equipment needs at discharge/impairments found/therapy frequency/risk reduction/prevention/rehab potential/anticipated discharge recommendation/functional limitations in following categories/predicted duration of therapy intervention

## 2018-07-05 NOTE — CDI QUERY NOTE - NSCDIOTHERTXTBX_GEN_ALL_CORE_HH
Metabolic Encephalopathy    Pt. admitted with AMS.  As per H&P- "Pt has baseline dementia, now worsening mental status likely due to UTI."  7/4 PN- "pt more alert today, lethargy not back to baseline, answers some questions appropriately at bedside with daughter.  Delirium, acute.  Plan: slowly improing, may be secondary to infectious process."  Please provide a dx for the underlying cause of the delirium if clinically significant.  1) AMS 2/2 delirium 2/2 metabolic encephalopathy due to UTI.  2) Other ( please specify)  3) Unknown

## 2018-07-05 NOTE — PHYSICAL THERAPY INITIAL EVALUATION ADULT - GAIT DEVIATIONS NOTED, PT EVAL
decreased step length/decreased stride length decreased step length/antalgic gait, decreased step length left >right/decreased stride length

## 2018-07-05 NOTE — PHYSICAL THERAPY INITIAL EVALUATION ADULT - MANUAL MUSCLE TESTING RESULTS, REHAB EVAL
Pt. with some difficulty following commands for MMT. Pt. requiring consistent cues for directions and follow through. UEs grossly 4/5, B/L LEs at least 3+/5 with exception to b/l hip flexion at least 3/5. Pt. intermittently closing eyes during testing.

## 2018-07-05 NOTE — PHYSICAL THERAPY INITIAL EVALUATION ADULT - ADDITIONAL COMMENTS
Pt. poor historian and unable to report. As per discussion with JOSE ANTONIO Rodriguez, pt. lives in a house with her children and was independent with functional mobility as per family report.  Pt. reports she has stairs, but is unable to report how many. Information about DME unavailable.

## 2018-07-06 RX ORDER — CALCIUM ACETATE 667 MG
667 TABLET ORAL
Qty: 0 | Refills: 0 | Status: DISCONTINUED | OUTPATIENT
Start: 2018-07-06 | End: 2018-07-07

## 2018-07-06 RX ORDER — ACETAMINOPHEN 500 MG
650 TABLET ORAL EVERY 6 HOURS
Qty: 0 | Refills: 0 | Status: DISCONTINUED | OUTPATIENT
Start: 2018-07-06 | End: 2018-07-07

## 2018-07-06 RX ORDER — PANTOPRAZOLE SODIUM 20 MG/1
40 TABLET, DELAYED RELEASE ORAL
Qty: 0 | Refills: 0 | Status: DISCONTINUED | OUTPATIENT
Start: 2018-07-06 | End: 2018-07-07

## 2018-07-06 RX ORDER — ONDANSETRON 8 MG/1
4 TABLET, FILM COATED ORAL ONCE
Qty: 0 | Refills: 0 | Status: DISCONTINUED | OUTPATIENT
Start: 2018-07-06 | End: 2018-07-06

## 2018-07-06 RX ORDER — DOCUSATE SODIUM 100 MG
100 CAPSULE ORAL DAILY
Qty: 0 | Refills: 0 | Status: DISCONTINUED | OUTPATIENT
Start: 2018-07-06 | End: 2018-07-07

## 2018-07-06 RX ORDER — ASPIRIN/CALCIUM CARB/MAGNESIUM 324 MG
81 TABLET ORAL DAILY
Qty: 0 | Refills: 0 | Status: DISCONTINUED | OUTPATIENT
Start: 2018-07-06 | End: 2018-07-07

## 2018-07-06 RX ORDER — ONDANSETRON 8 MG/1
4 TABLET, FILM COATED ORAL EVERY 6 HOURS
Qty: 0 | Refills: 0 | Status: DISCONTINUED | OUTPATIENT
Start: 2018-07-06 | End: 2018-07-07

## 2018-07-06 RX ORDER — MEMANTINE HYDROCHLORIDE 10 MG/1
10 TABLET ORAL
Qty: 0 | Refills: 0 | Status: DISCONTINUED | OUTPATIENT
Start: 2018-07-06 | End: 2018-07-07

## 2018-07-06 RX ORDER — METOPROLOL TARTRATE 50 MG
25 TABLET ORAL
Qty: 0 | Refills: 0 | Status: DISCONTINUED | OUTPATIENT
Start: 2018-07-06 | End: 2018-07-07

## 2018-07-06 RX ORDER — ENOXAPARIN SODIUM 100 MG/ML
40 INJECTION SUBCUTANEOUS DAILY
Qty: 0 | Refills: 0 | Status: DISCONTINUED | OUTPATIENT
Start: 2018-07-06 | End: 2018-07-07

## 2018-07-06 RX ORDER — SODIUM CHLORIDE 9 MG/ML
1000 INJECTION, SOLUTION INTRAVENOUS
Qty: 0 | Refills: 0 | Status: DISCONTINUED | OUTPATIENT
Start: 2018-07-06 | End: 2018-07-06

## 2018-07-06 RX ORDER — FENTANYL CITRATE 50 UG/ML
50 INJECTION INTRAVENOUS
Qty: 0 | Refills: 0 | Status: DISCONTINUED | OUTPATIENT
Start: 2018-07-06 | End: 2018-07-06

## 2018-07-06 RX ORDER — MORPHINE SULFATE 50 MG/1
2 CAPSULE, EXTENDED RELEASE ORAL EVERY 6 HOURS
Qty: 0 | Refills: 0 | Status: DISCONTINUED | OUTPATIENT
Start: 2018-07-06 | End: 2018-07-07

## 2018-07-06 RX ORDER — ALPRAZOLAM 0.25 MG
0.25 TABLET ORAL
Qty: 0 | Refills: 0 | Status: DISCONTINUED | OUTPATIENT
Start: 2018-07-06 | End: 2018-07-07

## 2018-07-06 RX ORDER — LACTOBACILLUS ACIDOPHILUS 100MM CELL
1 CAPSULE ORAL
Qty: 0 | Refills: 0 | Status: DISCONTINUED | OUTPATIENT
Start: 2018-07-06 | End: 2018-07-07

## 2018-07-06 RX ADMIN — Medication 667 MILLIGRAM(S): at 18:24

## 2018-07-06 RX ADMIN — CLOTRIMAZOLE AND BETAMETHASONE DIPROPIONATE 1 APPLICATION(S): 10; .5 CREAM TOPICAL at 06:12

## 2018-07-06 RX ADMIN — Medication 100 MILLIGRAM(S): at 18:24

## 2018-07-06 RX ADMIN — PANTOPRAZOLE SODIUM 40 MILLIGRAM(S): 20 TABLET, DELAYED RELEASE ORAL at 18:24

## 2018-07-06 RX ADMIN — Medication 25 MILLIGRAM(S): at 06:12

## 2018-07-06 RX ADMIN — MORPHINE SULFATE 2 MILLIGRAM(S): 50 CAPSULE, EXTENDED RELEASE ORAL at 19:05

## 2018-07-06 RX ADMIN — MORPHINE SULFATE 2 MILLIGRAM(S): 50 CAPSULE, EXTENDED RELEASE ORAL at 18:35

## 2018-07-06 RX ADMIN — Medication 25 MILLIGRAM(S): at 18:24

## 2018-07-06 RX ADMIN — MEMANTINE HYDROCHLORIDE 10 MILLIGRAM(S): 10 TABLET ORAL at 18:28

## 2018-07-06 RX ADMIN — Medication 0.25 MILLIGRAM(S): at 18:24

## 2018-07-06 RX ADMIN — Medication 1 TABLET(S): at 18:28

## 2018-07-06 RX ADMIN — Medication 1 ENEMA: at 18:20

## 2018-07-06 NOTE — BRIEF OPERATIVE NOTE - PROCEDURE
<<-----Click on this checkbox to enter Procedure Closed reduction of dislocation of total hip prosthesis  07/06/2018    Active  [facility]

## 2018-07-06 NOTE — PROVIDER CONTACT NOTE (OTHER) - SITUATION
pt noted to have posterior hip dislocation on CT scan 7/5/18. plan for OR today for closed reduction. will require new orders to resume PT post-op. please include WB status.

## 2018-07-07 ENCOUNTER — TRANSCRIPTION ENCOUNTER (OUTPATIENT)
Age: 78
End: 2018-07-07

## 2018-07-07 VITALS
TEMPERATURE: 97 F | RESPIRATION RATE: 19 BRPM | SYSTOLIC BLOOD PRESSURE: 109 MMHG | OXYGEN SATURATION: 98 % | HEART RATE: 69 BPM | DIASTOLIC BLOOD PRESSURE: 63 MMHG

## 2018-07-07 PROCEDURE — 82962 GLUCOSE BLOOD TEST: CPT

## 2018-07-07 PROCEDURE — 71260 CT THORAX DX C+: CPT

## 2018-07-07 PROCEDURE — 99285 EMERGENCY DEPT VISIT HI MDM: CPT | Mod: 25

## 2018-07-07 PROCEDURE — 97163 PT EVAL HIGH COMPLEX 45 MIN: CPT

## 2018-07-07 PROCEDURE — 80053 COMPREHEN METABOLIC PANEL: CPT

## 2018-07-07 PROCEDURE — 36415 COLL VENOUS BLD VENIPUNCTURE: CPT

## 2018-07-07 PROCEDURE — 70450 CT HEAD/BRAIN W/O DYE: CPT

## 2018-07-07 PROCEDURE — 72125 CT NECK SPINE W/O DYE: CPT

## 2018-07-07 PROCEDURE — 96374 THER/PROPH/DIAG INJ IV PUSH: CPT | Mod: 25

## 2018-07-07 PROCEDURE — 93005 ELECTROCARDIOGRAM TRACING: CPT

## 2018-07-07 PROCEDURE — 87086 URINE CULTURE/COLONY COUNT: CPT

## 2018-07-07 PROCEDURE — 85610 PROTHROMBIN TIME: CPT

## 2018-07-07 PROCEDURE — 82550 ASSAY OF CK (CPK): CPT

## 2018-07-07 PROCEDURE — 80048 BASIC METABOLIC PNL TOTAL CA: CPT

## 2018-07-07 PROCEDURE — 76377 3D RENDER W/INTRP POSTPROCES: CPT

## 2018-07-07 PROCEDURE — 74177 CT ABD & PELVIS W/CONTRAST: CPT

## 2018-07-07 PROCEDURE — 71045 X-RAY EXAM CHEST 1 VIEW: CPT

## 2018-07-07 PROCEDURE — 82140 ASSAY OF AMMONIA: CPT

## 2018-07-07 PROCEDURE — 73501 X-RAY EXAM HIP UNI 1 VIEW: CPT

## 2018-07-07 PROCEDURE — 80061 LIPID PANEL: CPT

## 2018-07-07 PROCEDURE — 81001 URINALYSIS AUTO W/SCOPE: CPT

## 2018-07-07 PROCEDURE — 84436 ASSAY OF TOTAL THYROXINE: CPT

## 2018-07-07 PROCEDURE — 85027 COMPLETE CBC AUTOMATED: CPT

## 2018-07-07 PROCEDURE — 72192 CT PELVIS W/O DYE: CPT

## 2018-07-07 PROCEDURE — 83735 ASSAY OF MAGNESIUM: CPT

## 2018-07-07 PROCEDURE — 84443 ASSAY THYROID STIM HORMONE: CPT

## 2018-07-07 PROCEDURE — 76000 FLUOROSCOPY <1 HR PHYS/QHP: CPT

## 2018-07-07 PROCEDURE — 84484 ASSAY OF TROPONIN QUANT: CPT

## 2018-07-07 PROCEDURE — 85730 THROMBOPLASTIN TIME PARTIAL: CPT

## 2018-07-07 RX ORDER — ZOLPIDEM TARTRATE 10 MG/1
1 TABLET ORAL
Qty: 0 | Refills: 0 | COMMUNITY
Start: 2018-07-07

## 2018-07-07 RX ORDER — NITROFURANTOIN MACROCRYSTAL 50 MG
100 CAPSULE ORAL
Qty: 0 | Refills: 0 | Status: DISCONTINUED | OUTPATIENT
Start: 2018-07-07 | End: 2018-07-07

## 2018-07-07 RX ORDER — NITROFURANTOIN MACROCRYSTAL 50 MG
1 CAPSULE ORAL
Qty: 0 | Refills: 0 | COMMUNITY
Start: 2018-07-07

## 2018-07-07 RX ORDER — DOCUSATE SODIUM 100 MG
1 CAPSULE ORAL
Qty: 0 | Refills: 0 | COMMUNITY
Start: 2018-07-07

## 2018-07-07 RX ORDER — ZOLPIDEM TARTRATE 10 MG/1
5 TABLET ORAL AT BEDTIME
Qty: 0 | Refills: 0 | Status: DISCONTINUED | OUTPATIENT
Start: 2018-07-07 | End: 2018-07-07

## 2018-07-07 RX ADMIN — Medication 100 MILLIGRAM(S): at 12:27

## 2018-07-07 RX ADMIN — PANTOPRAZOLE SODIUM 40 MILLIGRAM(S): 20 TABLET, DELAYED RELEASE ORAL at 06:11

## 2018-07-07 RX ADMIN — ONDANSETRON 4 MILLIGRAM(S): 8 TABLET, FILM COATED ORAL at 02:17

## 2018-07-07 RX ADMIN — Medication 25 MILLIGRAM(S): at 06:11

## 2018-07-07 RX ADMIN — MEMANTINE HYDROCHLORIDE 10 MILLIGRAM(S): 10 TABLET ORAL at 06:11

## 2018-07-07 RX ADMIN — MORPHINE SULFATE 2 MILLIGRAM(S): 50 CAPSULE, EXTENDED RELEASE ORAL at 02:17

## 2018-07-07 RX ADMIN — ENOXAPARIN SODIUM 40 MILLIGRAM(S): 100 INJECTION SUBCUTANEOUS at 12:27

## 2018-07-07 RX ADMIN — MORPHINE SULFATE 2 MILLIGRAM(S): 50 CAPSULE, EXTENDED RELEASE ORAL at 02:40

## 2018-07-07 RX ADMIN — Medication 0.25 MILLIGRAM(S): at 06:11

## 2018-07-07 RX ADMIN — Medication 81 MILLIGRAM(S): at 12:27

## 2018-07-07 NOTE — PROGRESS NOTE ADULT - PROBLEM SELECTOR PLAN 1
secondary to metabolic encephalopathy from infection; now back at baseline mental status
cont iv abx today
hx of arthritis, now with right hip pain and fall - will ask for ortho eval
improved

## 2018-07-07 NOTE — DISCHARGE NOTE ADULT - PATIENT PORTAL LINK FT
You can access the [facility] Portal, offered by [facility], by registering with the following website: [email or web address]

## 2018-07-07 NOTE — PROGRESS NOTE ADULT - PROBLEM SELECTOR PLAN 2
resolved
d/c abx today; pt eval
slowly improing, may be secondary to infectious process
spoke with ortho PA, will get ct b/l hips

## 2018-07-07 NOTE — DISCHARGE NOTE ADULT - CARE PROVIDER_API CALL
Merrick Ziegler), Family Medicine  [address] [place] [address]  Phone: [phone]  Fax: [phone]

## 2018-07-07 NOTE — DISCHARGE NOTE ADULT - HOSPITAL COURSE
78 yo female presented with confusion and weakness  admitted for uti and metabolic encephalopthy  started on iv abx and improved  pt had a dislocated right hip (chronic) which was reduced  pt stable for d/c to [facility]

## 2018-07-07 NOTE — DISCHARGE NOTE ADULT - CARE PLAN
Principal Discharge DX:	Delirium, acute  Goal:	to get stronger  Assessment and plan of treatment:	regular diet  Secondary Diagnosis:	UTI (urinary tract infection)  Secondary Diagnosis:	Hip pain, bilateral

## 2018-07-07 NOTE — PROGRESS NOTE ADULT - PROBLEM SELECTOR PLAN 3
s/p closed reduction yesterday; medically stable for d/c to [facility]
cont coverage for now
cont iv abx, decr fluids, check lytes in am
for closed reduction today; pt medically stable for procedure

## 2018-07-07 NOTE — DISCHARGE NOTE ADULT - MEDICATION SUMMARY - MEDICATIONS TO STOP TAKING
I will STOP taking the medications listed below when I get home from the hospital:    carbidopa-levodopa 25 mg-100 mg oral tablet  -- 1 tab(s) by mouth 3 times a day

## 2018-07-07 NOTE — DISCHARGE NOTE ADULT - MEDICATION SUMMARY - MEDICATIONS TO TAKE
I will START or STAY ON the medications listed below when I get home from the hospital:    aspirin 81 mg oral tablet, chewable  -- 1 tab(s) by mouth once a day  -- Indication: For Heart    Caltrate 600 mg oral tablet  -- 2 tab(s) by mouth once a day  -- Indication: For bones    Xanax 0.25 mg oral tablet  -- 1 tab(s) by mouth 3 times a day  -- Indication: For anxiety    zolpidem 5 mg oral tablet  -- 1 tab(s) by mouth once a day (at bedtime)  -- Indication: For Sleep    Prolia  -- Indication: For bones    docusate sodium 100 mg oral capsule  -- 1 cap(s) by mouth once a day  -- Indication: For Stool softener    Namenda 10 mg oral tablet  -- 1 tab(s) by mouth 2 times a day  -- Indication: For parkinsons    nitrofurantoin macrocrystals-monohydrate 100 mg oral capsule  -- 1 cap(s) by mouth 2 times a day (with meals)  -- Indication: For UTI (urinary tract infection)    Vitamin D3 5000 intl units oral capsule  -- Indication: For bones    Vitamin C 500 mg oral tablet  -- 1 tab(s) by mouth once a day  -- Indication: For vitamin

## 2018-08-02 ENCOUNTER — APPOINTMENT (OUTPATIENT)
Dept: RHEUMATOLOGY | Facility: CLINIC | Age: 78
End: 2018-08-02

## 2018-12-03 NOTE — PRE-OP CHECKLIST - INTERNAL PROSTHESES
Telephone Encounter by Shirley Myers at 10/25/18 02:11 PM     Author:  Shirley Myers Service:  (none) Author Type:  Patient      Filed:  10/25/18 02:16 PM Encounter Date:  10/25/2018 Status:  Signed     :  Shirley Myers (Patient )              IAN NARAYAN    Patient Age: 72 year old    ACCT STATUS: COPAY  MESSAGE:   Patient to be seen for[BS1.1T] high PSA levels[BS1.1M]     Has Imaging been completed?[BS1.1T] Yes - What type of imaging biopsy, when was imaging completed unknown, where was the imaging completed [facility][BS1.1M]     Has any other testing been completed?[BS1.1T] unknown[BS1.1M]    PCP name:[BS1.1T] Jolly Nava[BS1.1M]    PCP phone # (if outside of [facility]):[BS1.1T]  [phone]  Patient has recently had high PSA levels of 6.2 and wants to be seen for a second opinion[BS1.1M]      Next and Last Visit with Provider and Department  Next visit with JJ PARKER is on No match found  Next visit with UROLOGY is on No match found  Last visit with JJ PARKER was on No match found  Last visit with UROLOGY was on No match found     WEIGHT AND HEIGHT: As of 07/31/2018 weight is 182 lbs.(82.555 kg). Height is 6' 1\"(1.854 m).   BMI is 24.02 kg/(m^2) calculated from:     Height 6' 1\" (1.854 m) as of 7/31/18     Weight 182 lb (82.555 kg) as of 7/31/18      Allergies     Allergen  Reactions   • Amoxicillin Anaphylaxis     Current outpatient prescriptions       Medication  Sig Dispense Refill   • Cholecalciferol (VITAMIN D) 2000 UNITS CAPS Take  by mouth.     • Multiple Vitamin (MULTIVITAMIN OR) Take  by mouth.     • bisacodyl (DULCOLAX) 5 MG EC tablet See Colonoscopy Instructions. 2 Tab 0   • simethicone (MYLICON) 125 MG chewable tablet See Colonoscopy instructions. 2 Tab 0   • Na Sulfate-K Sulfate-Mg Sulf (SUPREP BOWEL PREP KIT) 17.5-3.13-1.6 GM/180ML SOLN See Colonoscopy Instructions. 1 Each 0   • GARLIC 1000 mg  daily     • TAMSULOSIN HCL OR Take  by mouth.       • Omega-3 Fatty Acids (FISH OIL OR) Take  by mouth. 2 -1000 mg tabs in the am      • HYDROCHLOROTHIAZIDE 25 mg daily.     • aspirin 81 MG tablet Take 81 mg by mouth daily. 2 tabs in the am and 2 tabs in the pm      • ATENOLOL 50 MG OR TABS 1 TABLET DAILY (Patient taking differently: 1 tab every am and two tabs every evening) 30 6      PHARMACY to use:[BS1.1T] na[BS1.1M]          Pharmacy preference(s) on file:    [facility] [address] [facility] [address] [facility]/PHARMACY [facility] [address] [facility]    CALL BACK INFO:[BS1.1T] Ok to leave response (including medical information) on answering machine[BS1.1M]  ROUTING:[BS1.1T] Patient's physician/staff[BS1.1M]        PCP: Jolly Nava Md         INS: Payor: MEDICARE CHOICE PPO / Plan: *No Plan* / Product Type: *No Product type* / Note: This is the primary coverage, but no account was found for this location or the patient's primary location.   ADDRESS:  [address] [place] [address] [address]  [facility] [id number][BS1.1T]       Revision History        User Key Date/Time User Provider Type Action    > BS1.1 10/25/18 02:16 PM Shirley Myers Patient  Sign    M - Manual, T - Template             Right hip/yes(specify)

## 2020-12-16 PROBLEM — N39.0 ACUTE UTI: Status: RESOLVED | Noted: 2018-06-12 | Resolved: 2020-12-16

## 2021-04-12 NOTE — PROGRESS NOTE ADULT - SUBJECTIVE AND OBJECTIVE BOX
Forms completed  
Forms fax and copy mailed to pt.  
SUBJECTIVE    REVIEW OF SYSTEMS    General: Not in any pain	    Skin/Breast: No rash  	  ENMT: No visual problems, no sore throat	    Respiratory and Thorax: No cough, No CP, No SOB  	  Cardiovascular: No CP, No palpitations    Gastrointestinal: No Abd pain, No N/V/D    Musculoskeletal: No Joint pain, No back pain	    Neurological: No headache    Psychiatric: No anxiety      OBJECTIVE    Vital Signs Last 24 Hrs  T(C): 36.1 (07-07-18 @ 16:29), Max: 36.4 (07-07-18 @ 04:15)  T(F): 97 (07-07-18 @ 16:29), Max: 97.5 (07-07-18 @ 04:15)  HR: 69 (07-07-18 @ 16:29) (69 - 74)  BP: 109/63 (07-07-18 @ 16:29) (109/63 - 163/75)  BP(mean): --  RR: 19 (07-07-18 @ 16:29) (18 - 19)  SpO2: 98% (07-07-18 @ 16:29) (97% - 98%)    PHYSICAL EXAM:    Constitutional: Not in any distress    Eyes: No conjunctival injection    ENMT: No oral lesions    Neck: No nodes, no adenopathy    Back: Straight, no defects    Respiratory: clear b/l    Cardiovascular: RRR, no murmur    Gastrointestinal: soft, NT, ND    Extremities: No edema, no erythema    Neurological: no focal deficit    Skin: No rash      MEDICATIONS  (STANDING):  ALPRAZolam 0.25 milliGRAM(s) Oral two times a day  aspirin  chewable 81 milliGRAM(s) Oral daily  calcium acetate 667 milliGRAM(s) Oral with dinner  docusate sodium 100 milliGRAM(s) Oral daily  enoxaparin Injectable 40 milliGRAM(s) SubCutaneous daily  lactobacillus acidophilus 1 Tablet(s) Oral two times a day with meals  memantine 10 milliGRAM(s) Oral two times a day  metoprolol tartrate 25 milliGRAM(s) Oral two times a day  nitrofurantoin (MACROBID) 100 milliGRAM(s) Oral two times a day with meals  pantoprazole    Tablet 40 milliGRAM(s) Oral before breakfast  zolpidem 5 milliGRAM(s) Oral at bedtime    MEDICATIONS  (PRN):  acetaminophen   Tablet 650 milliGRAM(s) Oral every 6 hours PRN For Temp greater than 38 C (100.4 F)  acetaminophen   Tablet. 650 milliGRAM(s) Oral every 6 hours PRN Mild Pain (1 - 3)  morphine  - Injectable 2 milliGRAM(s) IV Push every 6 hours PRN Moderate Pain (4 - 6)  ondansetron Injectable 4 milliGRAM(s) IV Push every 6 hours PRN Nausea and/or Vomiting                Allergies    sulfa drugs (Unknown)    Intolerances
EFRAÍN CHAND    [id number]    History: 77y Female with R total hip arthroplasty dislocation. The patient suffers from dementia and is unable to provide a reliable history. The patient is i no apparent distress. The patients daughter is bedside.        07-05    135  |  102  |  8.0  ----------------------------<  141<H>  4.4   |  23.0  |  0.45<L>    Ca    7.8<L>      05 Jul 2018 07:35        MEDICATIONS  (STANDING):  ALPRAZolam 0.25 milliGRAM(s) Oral two times a day  aspirin  chewable 81 milliGRAM(s) Oral daily  calcium acetate 667 milliGRAM(s) Oral with dinner  cefTRIAXone   IVPB 1 Gram(s) IV Intermittent every 24 hours  clotrimazole/betamethasone Cream 1 Application(s) Topical two times a day  dextrose 5% + sodium chloride 0.45%. 1000 milliLiter(s) (50 mL/Hr) IV Continuous <Continuous>  docusate sodium 100 milliGRAM(s) Oral daily  lactobacillus acidophilus 1 Tablet(s) Oral two times a day with meals  memantine 10 milliGRAM(s) Oral two times a day  metoprolol tartrate 25 milliGRAM(s) Oral two times a day  pantoprazole    Tablet 40 milliGRAM(s) Oral before breakfast    MEDICATIONS  (PRN):  acetaminophen   Tablet 650 milliGRAM(s) Oral every 6 hours PRN For Temp greater than 38 C (100.4 F)  acetaminophen   Tablet. 650 milliGRAM(s) Oral every 6 hours PRN Mild Pain (1 - 3)  magnesium hydroxide Suspension 30 milliLiter(s) Oral daily PRN Constipation  morphine  - Injectable 2 milliGRAM(s) IV Push every 6 hours PRN Moderate Pain (4 - 6)  ondansetron Injectable 4 milliGRAM(s) IV Push every 6 hours PRN Nausea and/or Vomiting      Physical exam: Physical exam limited secondary to dementia. Skin intact. No ecchymosis. The calf is supple nontender. No foot drop. 2+ dorsalis pedis pulse. Capillary refill is less than 2 seconds. No cyanosis.    Primary Orthopedic Assessment:  • 77y Female with R total hip arthroplasty dislocation.    Secondary  Orthopedic Assessment(s):   •     Secondary  Medical Assessment(s):   •     Plan:   • Pain control as clinically indicated  • DVT prophylaxis as prescribed, including use of compression devices and ankle pumps  • Pre-oped for closed reduction in OR today under moderate sedation.
Ortho Post Op Check    Name: EFRAÍN CHAND    MR #: [id number]    Procedure: closed reduction right NASIM  Surgeon: Bishnu    Pt present with daughter at bedside. Patient with history of dementia.  Patient appears comfortable , in bed, abduction pillow in place    General Exam:  Vital Signs Last 24 Hrs  T(C): 36.1 (07 Jul 2018 12:27), Max: 36.4 (06 Jul 2018 14:12)  T(F): 97 (07 Jul 2018 12:27), Max: 97.5 (06 Jul 2018 14:12)  HR: 74 (07 Jul 2018 12:27) (54 - 97)  BP: 140/77 (07 Jul 2018 12:27) (108/56 - 163/75)  BP(mean): --  RR: 18 (07 Jul 2018 12:27) (12 - 18)  SpO2: 97% (07 Jul 2018 04:15) (96% - 99%)    General: Pt confused NAD, controlled pain.  Pulses: 2+ dorsalis pedis pulse. Cap refill < 2 sec.  Sensation: Grossly intact to light touch without deficit.  Motor grossly intact. neg foot drop. Patient actively dorsiflexes ankle and foot            A/P: 77yFemale POD#1 s/p closed reduction right hip  - Stable  - Pain Control  - DVT ppx: on Lovenox 40 QD/ASA 81 QD  - PT eval pending  - Weight bearing status: WBAT RLE with posterior hip precaution  KEEP ABDUCTION PILLOW ON WHEN NOT AMBULATING WITH PT  do not recommend abduction brace at this time
Ortho Post Op Check    Name: EFRAÍN CHAND    MR #: [id number]    Procedure: Closed Reduction Right hip  Surgeon: Bishnu    Pt present with daughter at bedside. Patient with history of dementia.  Patient appears comfortable , in bed, abduction pillow in place    General Exam:  Vital Signs Last 24 Hrs  T(C): 36.3 (07-06-18 @ 16:23), Max: 36.3 (07-06-18 @ 16:23)  T(F): 97.3 (07-06-18 @ 16:23), Max: 97.3 (07-06-18 @ 16:23)  HR: 54 (07-06-18 @ 16:23) (54 - 78)  BP: 109/60 (07-06-18 @ 16:23) (109/60 - 130/72)  BP(mean): --  RR: 17 (07-06-18 @ 16:23) (13 - 17)  SpO2: 96% (07-06-18 @ 15:33) (96% - 96%)    General: Pt confused NAD, controlled pain.  Ecchymosis noted right torso/flank region  Pulses: 2+ dorsalis pedis pulse. Cap refill < 2 sec.  Sensation: Grossly intact to light touch without deficit.  Motor grossly intact. neg foot drop. Patient actively dorsiflexes ankle and foot    05 Jul 2018 07:35    135    |  102    |  8.0    ----------------------------<  141    4.4     |  23.0   |  0.45             A/P: 77yFemale POD#0 s/p closed reduction right hip  - Stable  - Pain Control  - DVT ppx: on Lovenox 40 QD/ASA 81 QD  - PT eval pending  - Weight bearing status: WBAT RLE  KEEP ABDUCTION PILLOW ON WHEN NOT AMBULATING WITH PT  Will order abduction brace
SUBJECTIVE    REVIEW OF SYSTEMS    General: Not in any pain	    Skin/Breast: No rash  	  ENMT: No visual problems, no sore throat	    Respiratory and Thorax: No cough, No CP, No SOB  	  Cardiovascular: No CP, No palpitations    Gastrointestinal: No Abd pain, No N/V/D    Musculoskeletal: No Joint pain, No back pain	    Neurological: No headache    Psychiatric: No anxiety      OBJECTIVE    Vital Signs Last 24 Hrs  T(C): 36.3 (07-06-18 @ 11:58), Max: 36.8 (07-05-18 @ 16:18)  T(F): 97.4 (07-06-18 @ 11:58), Max: 98.3 (07-05-18 @ 16:18)  HR: 65 (07-06-18 @ 11:58) (65 - 99)  BP: 144/53 (07-06-18 @ 11:58) (141/75 - 159/65)  BP(mean): --  RR: 13 (07-06-18 @ 11:58) (13 - 20)  SpO2: 99% (07-06-18 @ 11:58) (96% - 99%)    PHYSICAL EXAM:    Constitutional: Not in any distress    Eyes: No conjunctival injection    ENMT: No oral lesions    Neck: No nodes, no adenopathy    Back: Straight, no defects    Respiratory: clear b/l    Cardiovascular: RRR, no murmur    Gastrointestinal: soft, NT, ND    Extremities: No edema, no erythema    Neurological: no focal deficit    Skin: No rash      MEDICATIONS  (STANDING):  ALPRAZolam 0.25 milliGRAM(s) Oral two times a day  aspirin  chewable 81 milliGRAM(s) Oral daily  calcium acetate 667 milliGRAM(s) Oral with dinner  cefTRIAXone   IVPB 1 Gram(s) IV Intermittent every 24 hours  clotrimazole/betamethasone Cream 1 Application(s) Topical two times a day  dextrose 5% + sodium chloride 0.45%. 1000 milliLiter(s) (50 mL/Hr) IV Continuous <Continuous>  docusate sodium 100 milliGRAM(s) Oral daily  lactobacillus acidophilus 1 Tablet(s) Oral two times a day with meals  memantine 10 milliGRAM(s) Oral two times a day  metoprolol tartrate 25 milliGRAM(s) Oral two times a day  pantoprazole    Tablet 40 milliGRAM(s) Oral before breakfast    MEDICATIONS  (PRN):  acetaminophen   Tablet 650 milliGRAM(s) Oral every 6 hours PRN For Temp greater than 38 C (100.4 F)  acetaminophen   Tablet. 650 milliGRAM(s) Oral every 6 hours PRN Mild Pain (1 - 3)  magnesium hydroxide Suspension 30 milliLiter(s) Oral daily PRN Constipation  morphine  - Injectable 2 milliGRAM(s) IV Push every 6 hours PRN Moderate Pain (4 - 6)  ondansetron Injectable 4 milliGRAM(s) IV Push every 6 hours PRN Nausea and/or Vomiting          05 Jul 2018 07:35    135    |  102    |  8.0    ----------------------------<  141    4.4     |  23.0   |  0.45     Ca    7.8        05 Jul 2018 07:35      Allergies    sulfa drugs (Unknown)    Intolerances
SUBJECTIVE    pt more alert today, lethargy not back to baseline, answers some questions appropriately  at bedside with daughter    REVIEW OF SYSTEMS    General: complains of right hip pain	    Skin/Breast: No rash  	  ENMT: No visual problems, no sore throat	    Respiratory and Thorax: No cough, No CP, No SOB  	  Cardiovascular: No CP, No palpitations    Gastrointestinal: No Abd pain, No N/V/D    Musculoskeletal: No Joint pain, No back pain	    Neurological: No headache    Psychiatric: No anxiety      OBJECTIVE    Vital Signs Last 24 Hrs  T(C): 36.4 (07-04-18 @ 15:31), Max: 36.6 (07-04-18 @ 04:55)  T(F): 97.6 (07-04-18 @ 15:31), Max: 97.8 (07-04-18 @ 04:55)  HR: 85 (07-04-18 @ 15:31) (76 - 85)  BP: 145/70 (07-04-18 @ 15:31) (142/78 - 166/77)  BP(mean): --  RR: 18 (07-04-18 @ 04:55) (17 - 18)  SpO2: --    PHYSICAL EXAM:    Constitutional: Not in any distress    Eyes: No conjunctival injection    ENMT: No oral lesions    Neck: No nodes, no adenopathy    Back: Straight, no defects    Respiratory: clear b/l    Cardiovascular: RRR, no murmur    Gastrointestinal: soft, NT, ND    Extremities: No edema, no erythema    Neurological: no focal deficit    Skin: No rash      MEDICATIONS  (STANDING):  ALPRAZolam 0.25 milliGRAM(s) Oral two times a day  aspirin  chewable 81 milliGRAM(s) Oral daily  calcium acetate 667 milliGRAM(s) Oral with dinner  cefTRIAXone   IVPB 1 Gram(s) IV Intermittent every 24 hours  dextrose 5% + sodium chloride 0.9%. 1000 milliLiter(s) (50 mL/Hr) IV Continuous <Continuous>  docusate sodium 100 milliGRAM(s) Oral daily  enoxaparin Injectable 40 milliGRAM(s) SubCutaneous daily  lactobacillus acidophilus 1 Tablet(s) Oral two times a day with meals  memantine 10 milliGRAM(s) Oral two times a day    MEDICATIONS  (PRN):  acetaminophen   Tablet 650 milliGRAM(s) Oral every 6 hours PRN For Temp greater than 38 C (100.4 F)  acetaminophen   Tablet. 650 milliGRAM(s) Oral every 6 hours PRN Mild Pain (1 - 3)  morphine  - Injectable 2 milliGRAM(s) IV Push every 6 hours PRN Moderate Pain (4 - 6)  ondansetron Injectable 4 milliGRAM(s) IV Push every 6 hours PRN Nausea and/or Vomiting                Allergies    sulfa drugs (Unknown)    Intolerances
SUBJECTIVE    pt more awake and alert, sitting in chair, eating today    REVIEW OF SYSTEMS    General: pt states pain in hips, very uncomfortable	    Skin/Breast: No rash  	  ENMT: No visual problems, no sore throat	    Respiratory and Thorax: No cough, No CP, No SOB  	  Cardiovascular: No CP, No palpitations    Gastrointestinal: No Abd pain, No N/V/D    Musculoskeletal: No Joint pain, No back pain	    Neurological: No headache    Psychiatric: No anxiety      OBJECTIVE    Vital Signs Last 24 Hrs  T(C): 36.8 (07-05-18 @ 11:34), Max: 36.8 (07-05-18 @ 11:34)  T(F): 98.3 (07-05-18 @ 11:34), Max: 98.3 (07-05-18 @ 11:34)  HR: 111 (07-05-18 @ 11:34) (68 - 111)  BP: 159/76 (07-05-18 @ 11:34) (142/69 - 159/76)  BP(mean): --  RR: 18 (07-05-18 @ 11:34) (17 - 19)  SpO2: 95% (07-05-18 @ 11:34) (95% - 95%)    PHYSICAL EXAM:    Constitutional: Not in any distress    Eyes: No conjunctival injection    ENMT: No oral lesions    Neck: No nodes, no adenopathy    Back: Straight, no defects    Respiratory: clear b/l    Cardiovascular: RRR, no murmur    Gastrointestinal: soft, NT, ND    Extremities: No edema, no erythema    Neurological: no focal deficit    Skin: No rash      MEDICATIONS  (STANDING):  ALPRAZolam 0.25 milliGRAM(s) Oral two times a day  aspirin  chewable 81 milliGRAM(s) Oral daily  calcium acetate 667 milliGRAM(s) Oral with dinner  cefTRIAXone   IVPB 1 Gram(s) IV Intermittent every 24 hours  docusate sodium 100 milliGRAM(s) Oral daily  enoxaparin Injectable 40 milliGRAM(s) SubCutaneous daily  lactobacillus acidophilus 1 Tablet(s) Oral two times a day with meals  memantine 10 milliGRAM(s) Oral two times a day    MEDICATIONS  (PRN):  acetaminophen   Tablet 650 milliGRAM(s) Oral every 6 hours PRN For Temp greater than 38 C (100.4 F)  acetaminophen   Tablet. 650 milliGRAM(s) Oral every 6 hours PRN Mild Pain (1 - 3)  magnesium hydroxide Suspension 30 milliLiter(s) Oral daily PRN Constipation  morphine  - Injectable 2 milliGRAM(s) IV Push every 6 hours PRN Moderate Pain (4 - 6)  ondansetron Injectable 4 milliGRAM(s) IV Push every 6 hours PRN Nausea and/or Vomiting          05 Jul 2018 07:35    135    |  102    |  8.0    ----------------------------<  141    4.4     |  23.0   |  0.45     Ca    7.8        05 Jul 2018 07:35      Allergies    sulfa drugs (Unknown)    Intolerances

## 2023-10-23 NOTE — DISCHARGE NOTE ADULT - INSTRUCTIONS
English
Ortho Recs:  Continue with abduction brace when in bed.  WBAT with posterior hip precautions at all times.  Follow up with Dr. Goetz as needed.